# Patient Record
Sex: MALE | Race: WHITE | NOT HISPANIC OR LATINO | URBAN - METROPOLITAN AREA
[De-identification: names, ages, dates, MRNs, and addresses within clinical notes are randomized per-mention and may not be internally consistent; named-entity substitution may affect disease eponyms.]

---

## 2017-08-14 ENCOUNTER — OUTPATIENT (OUTPATIENT)
Dept: OUTPATIENT SERVICES | Facility: HOSPITAL | Age: 47
LOS: 1 days | Discharge: HOME | End: 2017-08-14

## 2017-08-14 DIAGNOSIS — E78.00 PURE HYPERCHOLESTEROLEMIA, UNSPECIFIED: ICD-10-CM

## 2017-08-14 DIAGNOSIS — E53.8 DEFICIENCY OF OTHER SPECIFIED B GROUP VITAMINS: ICD-10-CM

## 2017-08-14 DIAGNOSIS — N39.0 URINARY TRACT INFECTION, SITE NOT SPECIFIED: ICD-10-CM

## 2017-08-14 DIAGNOSIS — D50.9 IRON DEFICIENCY ANEMIA, UNSPECIFIED: ICD-10-CM

## 2017-08-14 DIAGNOSIS — E05.90 THYROTOXICOSIS, UNSPECIFIED WITHOUT THYROTOXIC CRISIS OR STORM: ICD-10-CM

## 2017-08-14 DIAGNOSIS — D64.9 ANEMIA, UNSPECIFIED: ICD-10-CM

## 2017-08-14 DIAGNOSIS — E11.9 TYPE 2 DIABETES MELLITUS WITHOUT COMPLICATIONS: ICD-10-CM

## 2017-08-14 DIAGNOSIS — R97.20 ELEVATED PROSTATE SPECIFIC ANTIGEN [PSA]: ICD-10-CM

## 2018-02-19 ENCOUNTER — OUTPATIENT (OUTPATIENT)
Dept: OUTPATIENT SERVICES | Facility: HOSPITAL | Age: 48
LOS: 1 days | Discharge: HOME | End: 2018-02-19

## 2018-02-19 DIAGNOSIS — D50.9 IRON DEFICIENCY ANEMIA, UNSPECIFIED: ICD-10-CM

## 2018-02-19 DIAGNOSIS — E05.90 THYROTOXICOSIS, UNSPECIFIED WITHOUT THYROTOXIC CRISIS OR STORM: ICD-10-CM

## 2018-02-19 DIAGNOSIS — D64.9 ANEMIA, UNSPECIFIED: ICD-10-CM

## 2018-02-19 DIAGNOSIS — E78.5 HYPERLIPIDEMIA, UNSPECIFIED: ICD-10-CM

## 2018-02-19 DIAGNOSIS — E11.9 TYPE 2 DIABETES MELLITUS WITHOUT COMPLICATIONS: ICD-10-CM

## 2018-08-20 ENCOUNTER — OUTPATIENT (OUTPATIENT)
Dept: OUTPATIENT SERVICES | Facility: HOSPITAL | Age: 48
LOS: 1 days | Discharge: HOME | End: 2018-08-20

## 2018-08-20 DIAGNOSIS — E05.90 THYROTOXICOSIS, UNSPECIFIED WITHOUT THYROTOXIC CRISIS OR STORM: ICD-10-CM

## 2018-08-20 DIAGNOSIS — N39.0 URINARY TRACT INFECTION, SITE NOT SPECIFIED: ICD-10-CM

## 2018-08-20 DIAGNOSIS — E11.9 TYPE 2 DIABETES MELLITUS WITHOUT COMPLICATIONS: ICD-10-CM

## 2018-08-20 DIAGNOSIS — D50.9 IRON DEFICIENCY ANEMIA, UNSPECIFIED: ICD-10-CM

## 2018-08-20 DIAGNOSIS — D64.9 ANEMIA, UNSPECIFIED: ICD-10-CM

## 2018-08-20 DIAGNOSIS — E55.9 VITAMIN D DEFICIENCY, UNSPECIFIED: ICD-10-CM

## 2018-08-20 DIAGNOSIS — E53.8 DEFICIENCY OF OTHER SPECIFIED B GROUP VITAMINS: ICD-10-CM

## 2018-08-20 DIAGNOSIS — E78.5 HYPERLIPIDEMIA, UNSPECIFIED: ICD-10-CM

## 2018-08-20 DIAGNOSIS — N40.0 BENIGN PROSTATIC HYPERPLASIA WITHOUT LOWER URINARY TRACT SYMPTOMS: ICD-10-CM

## 2019-02-25 ENCOUNTER — OUTPATIENT (OUTPATIENT)
Dept: OUTPATIENT SERVICES | Facility: HOSPITAL | Age: 49
LOS: 1 days | Discharge: HOME | End: 2019-02-25

## 2019-02-25 DIAGNOSIS — N40.0 BENIGN PROSTATIC HYPERPLASIA WITHOUT LOWER URINARY TRACT SYMPTOMS: ICD-10-CM

## 2019-02-25 DIAGNOSIS — E78.5 HYPERLIPIDEMIA, UNSPECIFIED: ICD-10-CM

## 2019-02-25 DIAGNOSIS — D64.9 ANEMIA, UNSPECIFIED: ICD-10-CM

## 2019-02-25 DIAGNOSIS — N39.0 URINARY TRACT INFECTION, SITE NOT SPECIFIED: ICD-10-CM

## 2019-02-25 DIAGNOSIS — D50.9 IRON DEFICIENCY ANEMIA, UNSPECIFIED: ICD-10-CM

## 2019-02-25 DIAGNOSIS — E55.9 VITAMIN D DEFICIENCY, UNSPECIFIED: ICD-10-CM

## 2019-02-25 DIAGNOSIS — E11.9 TYPE 2 DIABETES MELLITUS WITHOUT COMPLICATIONS: ICD-10-CM

## 2019-03-07 PROBLEM — Z00.00 ENCOUNTER FOR PREVENTIVE HEALTH EXAMINATION: Status: ACTIVE | Noted: 2019-03-07

## 2019-04-11 ENCOUNTER — APPOINTMENT (OUTPATIENT)
Dept: CARDIOLOGY | Facility: CLINIC | Age: 49
End: 2019-04-11
Payer: COMMERCIAL

## 2019-04-11 DIAGNOSIS — R94.31 ABNORMAL ELECTROCARDIOGRAM [ECG] [EKG]: ICD-10-CM

## 2019-04-11 PROCEDURE — 93325 DOPPLER ECHO COLOR FLOW MAPG: CPT

## 2019-04-11 PROCEDURE — 93320 DOPPLER ECHO COMPLETE: CPT

## 2019-04-11 PROCEDURE — 93351 STRESS TTE COMPLETE: CPT

## 2019-04-12 PROBLEM — R94.31 ABNORMAL EKG: Status: ACTIVE | Noted: 2019-04-12

## 2019-07-11 ENCOUNTER — APPOINTMENT (OUTPATIENT)
Dept: CARDIOLOGY | Facility: CLINIC | Age: 49
End: 2019-07-11

## 2019-09-04 ENCOUNTER — OUTPATIENT (OUTPATIENT)
Dept: OUTPATIENT SERVICES | Facility: HOSPITAL | Age: 49
LOS: 1 days | Discharge: HOME | End: 2019-09-04

## 2019-09-04 DIAGNOSIS — N39.0 URINARY TRACT INFECTION, SITE NOT SPECIFIED: ICD-10-CM

## 2019-09-04 DIAGNOSIS — E78.5 HYPERLIPIDEMIA, UNSPECIFIED: ICD-10-CM

## 2019-09-04 DIAGNOSIS — E05.90 THYROTOXICOSIS, UNSPECIFIED WITHOUT THYROTOXIC CRISIS OR STORM: ICD-10-CM

## 2019-09-04 DIAGNOSIS — N40.0 BENIGN PROSTATIC HYPERPLASIA WITHOUT LOWER URINARY TRACT SYMPTOMS: ICD-10-CM

## 2019-09-04 DIAGNOSIS — E55.9 VITAMIN D DEFICIENCY, UNSPECIFIED: ICD-10-CM

## 2019-09-04 DIAGNOSIS — D50.9 IRON DEFICIENCY ANEMIA, UNSPECIFIED: ICD-10-CM

## 2019-09-04 DIAGNOSIS — D64.9 ANEMIA, UNSPECIFIED: ICD-10-CM

## 2019-09-04 DIAGNOSIS — E03.8 OTHER SPECIFIED HYPOTHYROIDISM: ICD-10-CM

## 2019-09-04 DIAGNOSIS — E11.9 TYPE 2 DIABETES MELLITUS WITHOUT COMPLICATIONS: ICD-10-CM

## 2020-01-03 ENCOUNTER — OUTPATIENT (OUTPATIENT)
Dept: OUTPATIENT SERVICES | Facility: HOSPITAL | Age: 50
LOS: 1 days | Discharge: HOME | End: 2020-01-03
Payer: COMMERCIAL

## 2020-01-03 DIAGNOSIS — M54.5 LOW BACK PAIN: ICD-10-CM

## 2020-01-03 PROCEDURE — 72148 MRI LUMBAR SPINE W/O DYE: CPT | Mod: 26

## 2020-01-17 ENCOUNTER — OUTPATIENT (OUTPATIENT)
Dept: OUTPATIENT SERVICES | Facility: HOSPITAL | Age: 50
LOS: 1 days | Discharge: HOME | End: 2020-01-17
Payer: COMMERCIAL

## 2020-01-17 DIAGNOSIS — M25.561 PAIN IN RIGHT KNEE: ICD-10-CM

## 2020-01-17 PROCEDURE — 73721 MRI JNT OF LWR EXTRE W/O DYE: CPT | Mod: 26,RT

## 2020-04-25 ENCOUNTER — MESSAGE (OUTPATIENT)
Age: 50
End: 2020-04-25

## 2020-05-15 ENCOUNTER — APPOINTMENT (OUTPATIENT)
Dept: DISASTER EMERGENCY | Facility: HOSPITAL | Age: 50
End: 2020-05-15

## 2020-05-15 LAB
SARS-COV-2 IGG SERPL IA-ACNC: <0.1 INDEX
SARS-COV-2 IGG SERPL QL IA: NEGATIVE

## 2023-01-03 ENCOUNTER — EMERGENCY (EMERGENCY)
Facility: HOSPITAL | Age: 53
LOS: 0 days | Discharge: HOME | End: 2023-01-04
Attending: EMERGENCY MEDICINE | Admitting: EMERGENCY MEDICINE
Payer: COMMERCIAL

## 2023-01-03 VITALS
WEIGHT: 188.05 LBS | SYSTOLIC BLOOD PRESSURE: 188 MMHG | OXYGEN SATURATION: 99 % | TEMPERATURE: 96 F | HEART RATE: 85 BPM | RESPIRATION RATE: 16 BRPM | DIASTOLIC BLOOD PRESSURE: 108 MMHG

## 2023-01-03 DIAGNOSIS — R07.9 CHEST PAIN, UNSPECIFIED: ICD-10-CM

## 2023-01-03 DIAGNOSIS — M79.602 PAIN IN LEFT ARM: ICD-10-CM

## 2023-01-03 DIAGNOSIS — E78.5 HYPERLIPIDEMIA, UNSPECIFIED: ICD-10-CM

## 2023-01-03 DIAGNOSIS — Z20.822 CONTACT WITH AND (SUSPECTED) EXPOSURE TO COVID-19: ICD-10-CM

## 2023-01-03 DIAGNOSIS — R06.02 SHORTNESS OF BREATH: ICD-10-CM

## 2023-01-03 DIAGNOSIS — Z88.0 ALLERGY STATUS TO PENICILLIN: ICD-10-CM

## 2023-01-03 PROCEDURE — 99285 EMERGENCY DEPT VISIT HI MDM: CPT

## 2023-01-03 PROCEDURE — 93010 ELECTROCARDIOGRAM REPORT: CPT

## 2023-01-04 VITALS
OXYGEN SATURATION: 98 % | SYSTOLIC BLOOD PRESSURE: 142 MMHG | RESPIRATION RATE: 17 BRPM | DIASTOLIC BLOOD PRESSURE: 82 MMHG | HEART RATE: 62 BPM

## 2023-01-04 LAB
ALBUMIN SERPL ELPH-MCNC: 4.6 G/DL — SIGNIFICANT CHANGE UP (ref 3.5–5.2)
ALP SERPL-CCNC: 48 U/L — SIGNIFICANT CHANGE UP (ref 30–115)
ALT FLD-CCNC: 54 U/L — HIGH (ref 0–41)
ANION GAP SERPL CALC-SCNC: 11 MMOL/L — SIGNIFICANT CHANGE UP (ref 7–14)
AST SERPL-CCNC: 26 U/L — SIGNIFICANT CHANGE UP (ref 0–41)
BASOPHILS # BLD AUTO: 0.04 K/UL — SIGNIFICANT CHANGE UP (ref 0–0.2)
BASOPHILS NFR BLD AUTO: 0.6 % — SIGNIFICANT CHANGE UP (ref 0–1)
BILIRUB SERPL-MCNC: 0.4 MG/DL — SIGNIFICANT CHANGE UP (ref 0.2–1.2)
BUN SERPL-MCNC: 14 MG/DL — SIGNIFICANT CHANGE UP (ref 10–20)
CALCIUM SERPL-MCNC: 9.5 MG/DL — SIGNIFICANT CHANGE UP (ref 8.4–10.5)
CHLORIDE SERPL-SCNC: 100 MMOL/L — SIGNIFICANT CHANGE UP (ref 98–110)
CO2 SERPL-SCNC: 27 MMOL/L — SIGNIFICANT CHANGE UP (ref 17–32)
CREAT SERPL-MCNC: 1 MG/DL — SIGNIFICANT CHANGE UP (ref 0.7–1.5)
EGFR: 91 ML/MIN/1.73M2 — SIGNIFICANT CHANGE UP
EOSINOPHIL # BLD AUTO: 0.28 K/UL — SIGNIFICANT CHANGE UP (ref 0–0.7)
EOSINOPHIL NFR BLD AUTO: 3.9 % — SIGNIFICANT CHANGE UP (ref 0–8)
GLUCOSE SERPL-MCNC: 103 MG/DL — HIGH (ref 70–99)
HCT VFR BLD CALC: 46 % — SIGNIFICANT CHANGE UP (ref 42–52)
HGB BLD-MCNC: 15.5 G/DL — SIGNIFICANT CHANGE UP (ref 14–18)
IMM GRANULOCYTES NFR BLD AUTO: 0.3 % — SIGNIFICANT CHANGE UP (ref 0.1–0.3)
LIDOCAIN IGE QN: 26 U/L — SIGNIFICANT CHANGE UP (ref 7–60)
LYMPHOCYTES # BLD AUTO: 2.35 K/UL — SIGNIFICANT CHANGE UP (ref 1.2–3.4)
LYMPHOCYTES # BLD AUTO: 32.9 % — SIGNIFICANT CHANGE UP (ref 20.5–51.1)
MAGNESIUM SERPL-MCNC: 2.1 MG/DL — SIGNIFICANT CHANGE UP (ref 1.8–2.4)
MCHC RBC-ENTMCNC: 28.8 PG — SIGNIFICANT CHANGE UP (ref 27–31)
MCHC RBC-ENTMCNC: 33.7 G/DL — SIGNIFICANT CHANGE UP (ref 32–37)
MCV RBC AUTO: 85.5 FL — SIGNIFICANT CHANGE UP (ref 80–94)
MONOCYTES # BLD AUTO: 0.64 K/UL — HIGH (ref 0.1–0.6)
MONOCYTES NFR BLD AUTO: 9 % — SIGNIFICANT CHANGE UP (ref 1.7–9.3)
NEUTROPHILS # BLD AUTO: 3.82 K/UL — SIGNIFICANT CHANGE UP (ref 1.4–6.5)
NEUTROPHILS NFR BLD AUTO: 53.3 % — SIGNIFICANT CHANGE UP (ref 42.2–75.2)
NRBC # BLD: 0 /100 WBCS — SIGNIFICANT CHANGE UP (ref 0–0)
NT-PROBNP SERPL-SCNC: 8 PG/ML — SIGNIFICANT CHANGE UP (ref 0–300)
PLATELET # BLD AUTO: 160 K/UL — SIGNIFICANT CHANGE UP (ref 130–400)
POTASSIUM SERPL-MCNC: 3.8 MMOL/L — SIGNIFICANT CHANGE UP (ref 3.5–5)
POTASSIUM SERPL-SCNC: 3.8 MMOL/L — SIGNIFICANT CHANGE UP (ref 3.5–5)
PROT SERPL-MCNC: 6.7 G/DL — SIGNIFICANT CHANGE UP (ref 6–8)
RBC # BLD: 5.38 M/UL — SIGNIFICANT CHANGE UP (ref 4.7–6.1)
RBC # FLD: 13.3 % — SIGNIFICANT CHANGE UP (ref 11.5–14.5)
SARS-COV-2 RNA SPEC QL NAA+PROBE: SIGNIFICANT CHANGE UP
SODIUM SERPL-SCNC: 138 MMOL/L — SIGNIFICANT CHANGE UP (ref 135–146)
TROPONIN T SERPL-MCNC: <0.01 NG/ML — SIGNIFICANT CHANGE UP
TROPONIN T SERPL-MCNC: <0.01 NG/ML — SIGNIFICANT CHANGE UP
WBC # BLD: 7.15 K/UL — SIGNIFICANT CHANGE UP (ref 4.8–10.8)
WBC # FLD AUTO: 7.15 K/UL — SIGNIFICANT CHANGE UP (ref 4.8–10.8)

## 2023-01-04 PROCEDURE — 99223 1ST HOSP IP/OBS HIGH 75: CPT

## 2023-01-04 PROCEDURE — 93010 ELECTROCARDIOGRAM REPORT: CPT

## 2023-01-04 PROCEDURE — 75574 CT ANGIO HRT W/3D IMAGE: CPT | Mod: 26,MF

## 2023-01-04 PROCEDURE — 71045 X-RAY EXAM CHEST 1 VIEW: CPT | Mod: 26

## 2023-01-04 PROCEDURE — G1004: CPT

## 2023-01-04 RX ORDER — ASPIRIN/CALCIUM CARB/MAGNESIUM 324 MG
1 TABLET ORAL
Qty: 30 | Refills: 0
Start: 2023-01-04 | End: 2023-02-02

## 2023-01-04 RX ORDER — METOPROLOL TARTRATE 50 MG
100 TABLET ORAL ONCE
Refills: 0 | Status: DISCONTINUED | OUTPATIENT
Start: 2023-01-04 | End: 2023-01-04

## 2023-01-04 RX ORDER — ASPIRIN/CALCIUM CARB/MAGNESIUM 324 MG
1 TABLET ORAL
Qty: 14 | Refills: 0
Start: 2023-01-04 | End: 2023-01-17

## 2023-01-04 RX ORDER — METOPROLOL TARTRATE 50 MG
50 TABLET ORAL ONCE
Refills: 0 | Status: COMPLETED | OUTPATIENT
Start: 2023-01-04 | End: 2023-01-04

## 2023-01-04 RX ADMIN — Medication 50 MILLIGRAM(S): at 06:33

## 2023-01-04 NOTE — ED CDU PROVIDER INITIAL DAY NOTE - ATTENDING APP SHARED VISIT CONTRIBUTION OF CARE
52-year-old male past medical history of hyperlipidemia presents with chest pain.  Patient states that while driving started to feel left chest and left arm pain and pressure.  No shortness of breath.  Lasted for few hours then resolved.  No nausea vomiting.  No abdominal pain.  No numbness tingling or weakness.  No similar episodes in the past.  Patient follows with Dr. valencia but states the last stress test was over 3 years ago.  No leg swelling or pain.  Former smoker.    CONSTITUTIONAL: Well-developed; well-nourished; in no acute distress.   SKIN: warm, dry  HEAD: Normocephalic; atraumatic.  EYES: PERRL, EOMI, no conjunctival erythema  ENT: No nasal discharge; airway clear.  NECK: Supple; non tender.  CARD: S1, S2 normal;  Regular rate and rhythm.   RESP: No wheezes, rales or rhonchi.  ABD: soft non tender, non distended, no rebound or guarding  EXT: Normal ROM.  5/5 strength in all 4 extremities   LYMPH: No acute cervical adenopathy.  NEURO: Alert, oriented, grossly unremarkable. neurovascularly intact  PSYCH: Cooperative, appropriate.

## 2023-01-04 NOTE — ED CDU PROVIDER INITIAL DAY NOTE - OBJECTIVE STATEMENT
51 yo M with PMHx of HLD presents to the ED for evaluation of moderate L sided chest pain that started earlier tonight and has been persisting. Pain is pressure like, constant radiates to left arm and is associated with mild SOB. Pt has had intermittent pain over the last few weeks but never this bad. He admits to family hx of CAD, is non-smoker and last had CCTA > 5 years ago. He denies other complaints.

## 2023-01-04 NOTE — ED PROVIDER NOTE - NS ED ROS FT
Review of Systems  Constitutional:  No fever, chills.  Eyes:  No visual changes, eye pain, or discharge.  ENMT:  No hearing changes, pain, or discharge. No nasal congestion, discharge, or bleeding. No throat pain, swelling, or difficulty swallowing.  Cardiac:  No palpitations, syncope, or edema. (+) chest pain  Respiratory:  No cough. No hemoptysis. (+) SOB  GI:  No nausea, vomiting, diarrhea, or abdominal pain.   :  No dysuria, hematuria, frequency, or burning.   MS:  No back pain.  Skin:  No skin rash, pruritis, jaundice, or lesions.  Neuro:  No headache, dizziness, loss of sensation, or focal weakness.  No change in mental status.   Endocrine: No history of thyroid disease or diabetes.

## 2023-01-04 NOTE — ED CDU PROVIDER DISPOSITION NOTE - PATIENT PORTAL LINK FT
You can access the FollowMyHealth Patient Portal offered by [facility] by registering at the following website: [email or web address]. By joining [employer] [facility] [employer] [facility]’s FollowMyHealth portal, you will also be able to view your health information using other applications (apps) compatible with our system.

## 2023-01-04 NOTE — ED CDU PROVIDER INITIAL DAY NOTE - CLINICAL SUMMARY MEDICAL DECISION MAKING FREE TEXT BOX
Patient presents with chest pain.  Labs EKG chest x-ray done.  No acute findings.  Patient placed in ops for further cardiac evaluation.

## 2023-01-04 NOTE — ED PROVIDER NOTE - OBJECTIVE STATEMENT
53 yo M with PMHx of HLD presents to the ED for evaluation of moderate L sided chest pain that started earlier tonight and has been persisting. Pain is pressure like, constant radiates to left arm and is associated with mild SOB. Pt has had intermittent pain over the last few weeks but never this bad. He admits to family hx of CAD, is non-smoker and last had CCTA > 5 years ago. He denies other complaints.

## 2023-01-04 NOTE — ED PROVIDER NOTE - NSICDXFAMILYHX_GEN_ALL_CORE_FT
FAMILY HISTORY:  Father  Still living? Unknown  Family history of early CAD, Age at diagnosis: Age Unknown    Sibling  Still living? Unknown  Family history of early CAD, Age at diagnosis: Age Unknown

## 2023-01-04 NOTE — ED PROVIDER NOTE - ATTENDING APP SHARED VISIT CONTRIBUTION OF CARE
52M non-smoker pmh hl p/w CP x 1d. Started at rest, pressure-like, radiating down LUE, accomp by mild sob. Has been occuring intermittently x sev wks. No dizziness, diaphoresis, nv, abd pain, leg edema. +FHx CAD. CCTA >5y ago.     PE:  nad  skin warm, dry  ncat  neck supple  rrr nl s1s2 no mrg  ctab no wrr  abd soft ntnd no palpable masses no rgr  back non-tender no cvat  ext no cce dpi  neuro aaox3 grossly nf exam

## 2023-01-04 NOTE — ED CDU PROVIDER DISPOSITION NOTE - CLINICAL COURSE
Patient presents with chest pain.  Labs EKG chest x-ray done.  No acute findings.  Patient placed in ops for further cardiac evaluation. CCTA done which shows a score of 1. Results discussed with patient. Patient is already on statin, will start him on daily asprin. Discharged with cardiology follow up and return precautions.

## 2023-01-04 NOTE — ED CDU PROVIDER DISPOSITION NOTE - NSFOLLOWUPINSTRUCTIONS_ED_ALL_ED_FT
Please follow up with a cardiologist as soon as possible.     Chest Pain    Chest pain can be caused by many different conditions which may or may not be dangerous. Causes include heartburn, lung infections, heart attack, blood clot in lungs, skin infections, strain or damage to muscle, cartilage, or bones, etc. In addition to a history and physical examination, an electrocardiogram (ECG) or other lab tests may have been performed to determine the cause of your chest pain. Follow up with your primary care provider or with a cardiologist as instructed.     SEEK IMMEDIATE MEDICAL CARE IF YOU HAVE ANY OF THE FOLLOWING SYMPTOMS: worsening chest pain, coughing up blood, unexplained back/neck/jaw pain, severe abdominal pain, dizziness or lightheadedness, fainting, shortness of breath, sweaty or clammy skin, vomiting, or racing heart beat. These symptoms may represent a serious problem that is an emergency. Do not wait to see if the symptoms will go away. Get medical help right away. Call 911 and do not drive yourself to the hospital.

## 2023-01-04 NOTE — ED PROVIDER NOTE - PHYSICAL EXAMINATION
VITAL SIGNS: I have reviewed nursing notes and confirm.  CONSTITUTIONAL: 53 yo M laying on stretcher; in no acute distress.  SKIN: Skin exam is warm and dry, no acute rash.  HEAD: Normocephalic; atraumatic.  EYES: Conjunctiva and sclera clear.  ENT: No nasal discharge; airway clear.   NECK: Supple; non tender.  CARD: S1, S2 normal; no murmurs, gallops, or rubs. Regular rate and rhythm.  RESP: No wheezes, rales or rhonchi. Speaking in full sentences.   ABD: Normal bowel sounds; soft; non-distended; non-tender; No rebound or guarding. No CVA tenderness.  EXT: Normal ROM. No clubbing, cyanosis or edema. No calf TTP or swelling. Radial and DP 2+ B/L and equal.   NEURO: Alert, oriented. Grossly unremarkable. No focal deficits.

## 2023-01-04 NOTE — ED PROVIDER NOTE - NS ED ATTENDING STATEMENT MOD
This was a shared visit with the TERESSA. I reviewed and verified the documentation and independently performed the documented:

## 2023-01-04 NOTE — ED CDU PROVIDER DISPOSITION NOTE - NS ED ATTENDING STATEMENT MOD
This was a shared visit with the TERESSA. I reviewed and verified the documentation and independently performed the documented: Attending with

## 2023-01-04 NOTE — ED CDU PROVIDER DISPOSITION NOTE - CARE PROVIDER_API CALL
Maxim Styles (MD)  Cardiovascular Disease; Interventional Cardiology  [address]  [place] [address]  Phone: [phone]  Fax: [phone]  Follow Up Time:

## 2023-08-02 ENCOUNTER — OUTPATIENT (OUTPATIENT)
Dept: OUTPATIENT SERVICES | Facility: HOSPITAL | Age: 53
LOS: 1 days | End: 2023-08-02
Payer: COMMERCIAL

## 2023-08-02 DIAGNOSIS — M54.9 DORSALGIA, UNSPECIFIED: ICD-10-CM

## 2023-08-02 DIAGNOSIS — Z00.8 ENCOUNTER FOR OTHER GENERAL EXAMINATION: ICD-10-CM

## 2023-08-02 PROCEDURE — 72148 MRI LUMBAR SPINE W/O DYE: CPT

## 2023-08-02 PROCEDURE — 72148 MRI LUMBAR SPINE W/O DYE: CPT | Mod: 26

## 2023-08-03 DIAGNOSIS — M54.9 DORSALGIA, UNSPECIFIED: ICD-10-CM

## 2023-08-30 ENCOUNTER — APPOINTMENT (OUTPATIENT)
Dept: NEUROSURGERY | Facility: CLINIC | Age: 53
End: 2023-08-30
Payer: COMMERCIAL

## 2023-08-30 VITALS — HEIGHT: 69 IN | BODY MASS INDEX: 32.58 KG/M2 | WEIGHT: 220 LBS

## 2023-08-30 DIAGNOSIS — F17.200 NICOTINE DEPENDENCE, UNSPECIFIED, UNCOMPLICATED: ICD-10-CM

## 2023-08-30 DIAGNOSIS — Z80.9 FAMILY HISTORY OF MALIGNANT NEOPLASM, UNSPECIFIED: ICD-10-CM

## 2023-08-30 DIAGNOSIS — Z86.79 PERSONAL HISTORY OF OTHER DISEASES OF THE CIRCULATORY SYSTEM: ICD-10-CM

## 2023-08-30 DIAGNOSIS — Z83.3 FAMILY HISTORY OF DIABETES MELLITUS: ICD-10-CM

## 2023-08-30 PROCEDURE — 99204 OFFICE O/P NEW MOD 45 MIN: CPT

## 2023-08-30 RX ORDER — ROSUVASTATIN CALCIUM 5 MG/1
TABLET, FILM COATED ORAL
Refills: 0 | Status: ACTIVE | COMMUNITY

## 2023-08-30 RX ORDER — EZETIMIBE 10 MG/1
TABLET ORAL
Refills: 0 | Status: ACTIVE | COMMUNITY

## 2023-08-30 RX ORDER — VALSARTAN 40 MG/1
TABLET, COATED ORAL
Refills: 0 | Status: ACTIVE | COMMUNITY

## 2023-08-30 RX ORDER — AMLODIPINE BESYLATE 5 MG/1
5 TABLET ORAL
Refills: 0 | Status: ACTIVE | COMMUNITY

## 2023-08-30 NOTE — ASSESSMENT
[FreeTextEntry1] : This is a 54 yo M who presents for neurosurgical consultation with regards to lumbar radiculopathy in the setting of spondylolisthesis at L5 along with neuroforaminal narrowing most notable at right L4-5.  X-ray lumbar flex/ext warranted for my review in order to assess stability at the L5 segment  Patient has been referred for physical therapy. Sessions advised 2-3 times weekly in conjunction with home PT program  Pain Management consultation to consider LSNRI  Patient to return to the office in 3 months for continued care/treatment efforts. We have briefly discussed the possibly indication for L4-5 decompressive laminectomy with Coflex. Further discussion will take place at his next encounter pending his above mentioned treatment response.  ZAINAB Espinoza MD

## 2023-08-30 NOTE — HISTORY OF PRESENT ILLNESS
[de-identified] : CHIEF COMPLAINT: Mr. Valencia is a 52 yo M who presents for neurosurgical consultation with regards to low back pain with associated radicular features into the bilateral lower extremities. Symptoms have intensified over the previous six months but he notes a chronic element of low back pain spanning 20+ years. Subjective weakness noted into the lower extremities with constant numbness/ tingling/ burning emanating from the low back into the entirety of the bilateral lower extremities.  CONSERVATIVE MANAGEMENT TRIALED: No recent physical therapy or Pain Management treatments have been completed  DIAGNOSTIC TESTING: MR COLON Spine- [facility]- 8/2/2023- grade 1 spondylolisthesis at L5. Degenerative disc changes most notable at L4-S1 with central and biforaminal narrowing, worse onto the right at L4-5.   PHYSICAL EXAM:  Neurologic:  CN II-XII grossly intact Palpation: (+) lumbar paravertebral tenderness to palpation Strength: (+) bilateral hip flexor 4+/5, otherwise 5/5 throughout all remaining muscle groups Sensation: Full sensation to light touch in all extremities Reflexes:   2+ patellar  2+ ankle jerk  ROM limited  Signs: SLR negative  Gait: steady, walking w/o assistance.

## 2023-08-31 ENCOUNTER — APPOINTMENT (OUTPATIENT)
Dept: PAIN MANAGEMENT | Facility: CLINIC | Age: 53
End: 2023-08-31
Payer: COMMERCIAL

## 2023-08-31 PROCEDURE — 99204 OFFICE O/P NEW MOD 45 MIN: CPT

## 2023-08-31 RX ORDER — ROSUVASTATIN CALCIUM 10 MG/1
10 TABLET, FILM COATED ORAL
Refills: 0 | Status: ACTIVE | COMMUNITY

## 2023-09-01 ENCOUNTER — OUTPATIENT (OUTPATIENT)
Dept: OUTPATIENT SERVICES | Facility: HOSPITAL | Age: 53
LOS: 1 days | End: 2023-09-01
Payer: COMMERCIAL

## 2023-09-01 DIAGNOSIS — M43.10 SPONDYLOLISTHESIS, SITE UNSPECIFIED: ICD-10-CM

## 2023-09-01 PROCEDURE — 72110 X-RAY EXAM L-2 SPINE 4/>VWS: CPT

## 2023-09-01 PROCEDURE — 72110 X-RAY EXAM L-2 SPINE 4/>VWS: CPT | Mod: 26

## 2023-09-02 DIAGNOSIS — M43.10 SPONDYLOLISTHESIS, SITE UNSPECIFIED: ICD-10-CM

## 2023-09-04 NOTE — HISTORY OF PRESENT ILLNESS
[FreeTextEntry1] : HPI: Mr. Valencia is a 53 year old male with a chief complaint of low back and leg pain. This patient had a disc injury few years ago and has worsened over the last 6 months from ware and tare. Today we reviewed his MRI of the lumbar spine in detail with the patient. The patients concerned today is the weakness and numbness in legs when standing prolong, right is worse than left lower leg. The pain comes on upon standing across the low back. He reports of intermittent bilateral radiating pain down thighs and lateral leg to the knee that comes on when prolong sitting. The pain is moderate to severe that is nearly constant of the time with symptoms worsening in no typical pattern. The pain is sharp, pressure-like, cramping, dull/achy, and throbbing. The pain is increased with standing, sitting, walking, and exercise. The pain is decreased with lying down and relaxation. The patient pain is 6/10 on the pain scale today.  ACTIVITIES: Patient can sit 1 hour before pain starts.  Patient can stand 10 minutes before pain starts.  The patient never lies down because of pain.  Patient uses no assisted walking device at this time.  Patient has difficulty performing household chores, going to work, doing yardwork or shopping, socializing with friends, participating in recreational activities and exercise at this time.

## 2023-09-04 NOTE — DISCUSSION/SUMMARY
[de-identified] : A discussion regarding available pain management treatment options occurred with the patient.  These included interventional, rehabilitative, pharmacological, and alternative modalities. We will proceed with the following:  Interventional treatment options: Will proceed with Right sided L4-L5, L5-S1 TFESI w/ MAC Treatment options were discussed. The patient has been having persistent, severe low back pain and lumbar radicular pain that has minimally improved with conservative therapy thus far (including physical therapy, home stretching and anti-inflammatory medications. The patient was given the option of proceeding with a lumbar epidural steroid injection to try and get the patient some pain relief. The risks and benefits were discussed, which included the associated problems with steroids, bleeding, nerve injury, lack of improvement with pain, and 0.5% chance of a post dural puncture headache.  Plan to perform left sided L4-L5, L5-S1 TFESI w/ MAC 2-4 weeks after the left  Rehabilitative options: - participation in active HEP was discussed  Complementary treatment options: - lifestyle modifications discussed  This patient is going for his flexion and extension X ray of the lumbar spine   I Farnaz Escoto attest that this documentation has been prepared under the direction and in the presence of provider Dr. Maurilio Ortiz.  The documentation recorded by the scribe in my presence, accurately reflects the service I personally performed, and the decisions made by me with my edits as appropriate.   Maurilio Ortiz, DO

## 2023-09-04 NOTE — DATA REVIEWED
[FreeTextEntry1] : MRI of the lumbar spine 8/2/23: Multilevel degenerative changes and disc bulging contributing to spinal canal as well as neuroforaminal narrowing most significant at the L4-L5 and L5-S1 levels Grade 1 retrolisthesis of L4 and L5 with uncovering of the disc space which is slightly worsened since the prior MRI contributing to severe spinal stenosis as well as BL neuroforaminal narrowing.  Marrow edema noted of the BL L5 laminae which may reflect advanced degenerative changes, stress reaction or traumatic injury. Mild BL hydroureteronephrosis, new

## 2023-09-04 NOTE — PHYSICAL EXAM
[de-identified] : Lumbar Spine Exam: Inspection: erythema (-) ecchymosis (-) rashes (-) alignment: no scoliosis  Palpation: Midline lumbar tenderness:             (-) paraspinal tenderness:                  L (-) ; R (-) sciatic nerve tenderness :             L (-) ; R (+) SI joint tenderness:                        L (-) ; R (-) GTB tenderness:                            L (-);  R (-)  Limited ROM 2/2 to pain w/ lumbar flexion  Strength: 5/5 throughout all muscle groups of the lower extremity                                     Right       Left    Hip Flexion:                (5/5)       (5/5) Quadriceps:               (5/5)       (5/5) Hamstrings:                (5/5)       (5/5) Ankle Dorsiflexion:     (5/5)       (5/5) EHL:                           (5/5)       (5/5) Ankle Plantarflexion:  (5/5)       (5/5)  Special Tests: SLR:                           R (+) ; L (-) Facet loading:            R (+) ; L (-) SHARRON test:               R (-) ; L (-)  Neurologic: Light touch intact throughout LE  Reflexes normal and symmetric   Gait: non- antalgic gait ambulates w/o assistive device

## 2023-09-20 ENCOUNTER — APPOINTMENT (OUTPATIENT)
Dept: PAIN MANAGEMENT | Facility: CLINIC | Age: 53
End: 2023-09-20

## 2023-10-04 ENCOUNTER — APPOINTMENT (OUTPATIENT)
Dept: PAIN MANAGEMENT | Facility: CLINIC | Age: 53
End: 2023-10-04

## 2023-10-04 ENCOUNTER — APPOINTMENT (OUTPATIENT)
Dept: PAIN MANAGEMENT | Facility: CLINIC | Age: 53
End: 2023-10-04
Payer: COMMERCIAL

## 2023-10-04 ENCOUNTER — TRANSCRIPTION ENCOUNTER (OUTPATIENT)
Age: 53
End: 2023-10-04

## 2023-10-04 PROCEDURE — 64484 NJX AA&/STRD TFRM EPI L/S EA: CPT | Mod: RT

## 2023-10-04 PROCEDURE — 94761 N-INVAS EAR/PLS OXIMETRY MLT: CPT | Mod: 59

## 2023-10-04 PROCEDURE — 93040 RHYTHM ECG WITH REPORT: CPT | Mod: 59

## 2023-10-04 PROCEDURE — 93770 DETERMINATION VENOUS PRESS: CPT | Mod: 59

## 2023-10-04 PROCEDURE — 64483 NJX AA&/STRD TFRM EPI L/S 1: CPT | Mod: RT

## 2023-10-24 ENCOUNTER — APPOINTMENT (OUTPATIENT)
Dept: PAIN MANAGEMENT | Facility: CLINIC | Age: 53
End: 2023-10-24
Payer: COMMERCIAL

## 2023-10-24 VITALS — WEIGHT: 220 LBS | HEIGHT: 69 IN | BODY MASS INDEX: 32.58 KG/M2

## 2023-10-24 DIAGNOSIS — M54.16 RADICULOPATHY, LUMBAR REGION: ICD-10-CM

## 2023-10-24 PROCEDURE — 99214 OFFICE O/P EST MOD 30 MIN: CPT

## 2023-10-24 RX ORDER — MELOXICAM 15 MG/1
15 TABLET ORAL
Qty: 30 | Refills: 0 | Status: ACTIVE | COMMUNITY
Start: 2023-10-24 | End: 1900-01-01

## 2023-11-08 ENCOUNTER — APPOINTMENT (OUTPATIENT)
Dept: PAIN MANAGEMENT | Facility: CLINIC | Age: 53
End: 2023-11-08

## 2023-11-22 ENCOUNTER — APPOINTMENT (OUTPATIENT)
Dept: PAIN MANAGEMENT | Facility: CLINIC | Age: 53
End: 2023-11-22

## 2023-11-29 ENCOUNTER — APPOINTMENT (OUTPATIENT)
Dept: NEUROSURGERY | Facility: CLINIC | Age: 53
End: 2023-11-29
Payer: COMMERCIAL

## 2023-11-29 VITALS — WEIGHT: 220 LBS | HEIGHT: 69 IN | BODY MASS INDEX: 32.58 KG/M2

## 2023-11-29 DIAGNOSIS — M48.07 SPINAL STENOSIS, LUMBOSACRAL REGION: ICD-10-CM

## 2023-11-29 DIAGNOSIS — M43.10 SPONDYLOLISTHESIS, SITE UNSPECIFIED: ICD-10-CM

## 2023-11-29 PROCEDURE — 99213 OFFICE O/P EST LOW 20 MIN: CPT

## 2023-12-18 ENCOUNTER — APPOINTMENT (OUTPATIENT)
Dept: PAIN MANAGEMENT | Facility: CLINIC | Age: 53
End: 2023-12-18

## 2024-02-12 ENCOUNTER — APPOINTMENT (OUTPATIENT)
Dept: NEUROSURGERY | Facility: CLINIC | Age: 54
End: 2024-02-12

## 2024-07-08 ENCOUNTER — APPOINTMENT (OUTPATIENT)
Dept: PAIN MANAGEMENT | Facility: CLINIC | Age: 54
End: 2024-07-08

## 2024-09-05 ENCOUNTER — APPOINTMENT (OUTPATIENT)
Dept: PAIN MANAGEMENT | Facility: CLINIC | Age: 54
End: 2024-09-05
Payer: COMMERCIAL

## 2024-09-05 DIAGNOSIS — M43.10 SPONDYLOLISTHESIS, SITE UNSPECIFIED: ICD-10-CM

## 2024-09-05 DIAGNOSIS — M54.16 RADICULOPATHY, LUMBAR REGION: ICD-10-CM

## 2024-09-05 PROCEDURE — 99214 OFFICE O/P EST MOD 30 MIN: CPT

## 2024-09-05 NOTE — DATA REVIEWED
[FreeTextEntry1] : Lumbosacral spine flexion and extension done on 9/1/23: IMPRESSION: Grade 1 anterolisthesis of L4-5 and L5-S1, without evidence of motion.  MRI L Spine- 8/2/2023- grade 1 spondylolisthesis at L5. Degenerative disc changes most notable at L4-S1 with central and biforaminal narrowing, worse onto the right at L4-5.

## 2024-09-05 NOTE — DISCUSSION/SUMMARY
[de-identified] : A discussion regarding available pain management treatment options occurred with the patient.  These included interventional, rehabilitative, pharmacological, and alternative modalities. We will proceed with the followin. Will proceed with bilateral L5-S1 TFESI w/ MAC 2. participation in active HEP  3. Ordered to start Meloxicam 15mg qD PRN  Treatment options were discussed. The patient has been having persistent, severe low back pain and lumbar radicular pain that has minimally improved with conservative therapy thus far (including physical therapy, home stretching and anti-inflammatory medications. The patient was given the option of proceeding with a left sided lumbar epidural steroid injection to try and get the patient some pain relief. The risks and benefits were discussed, which included the associated problems with steroids, bleeding, nerve injury, lack of improvement with pain, and 0.5% chance of a post dural puncture headache.  Home exercise program: Patient given specific home exercises to do including but not limited to: pelvic tilt, knee to chest, and lower trunk rotation.  I advised Maurilio, that the NSAID should be taken with food.  In addition while taking the prescribed NSAID, no over the counter or other NSAIDs should be used, such as ibuprofen (Motrin or Advil) or naproxen (Aleve) as this can cause stomach upset or other side effects.  If needed for fever or breakthrough pain Tylenol can be used.  follow up two weeks after injection   I Farnaz Escoto attest that this documentation has been prepared under the direction and in the presence of provider Dr. Maurilio Ortiz.  The documentation recorded by the scribe in my presence, accurately reflects the service I personally performed, and the decisions made by me with my edits as appropriate.   Maurilio Ortiz, DO

## 2024-09-05 NOTE — PHYSICAL EXAM
[de-identified] : L spine   No rashes, erythema, edema noted TTP b/l lumbar paraspinals and sciatic notch Positive straight leg raise bilaterally LLE: 5/5 strength RLE: 5/5 strength Sensation intact b/l LE

## 2024-09-23 ENCOUNTER — APPOINTMENT (OUTPATIENT)
Facility: CLINIC | Age: 54
End: 2024-09-23

## 2024-09-23 ENCOUNTER — APPOINTMENT (OUTPATIENT)
Dept: PAIN MANAGEMENT | Facility: CLINIC | Age: 54
End: 2024-09-23
Payer: COMMERCIAL

## 2024-09-23 DIAGNOSIS — M54.16 RADICULOPATHY, LUMBAR REGION: ICD-10-CM

## 2024-09-23 PROCEDURE — 64483 NJX AA&/STRD TFRM EPI L/S 1: CPT | Mod: 50

## 2024-09-23 PROCEDURE — 00630 ANES PX LUMBAR REGION NOS: CPT | Mod: QZ,P2

## 2024-09-23 NOTE — PROCEDURE
[FreeTextEntry3] : Date of Service: 09/23/2024 Account: [id number] Patient: LISA VELAZQUEZ YOB: 1970 Age: 54 year Surgeon:   Lisa Ortiz DO Assistant:    None Pre-Operative Diagnosis:         Lumbosacral radiculopathy Post Operative Diagnosis:        Lumbosacral radiculopathy Procedure:             Bilateral L5-S1 transforaminal epidural steroid injection under fluoroscopic guidance. Anesthesia:           None   This procedure was carried out using fluoroscopic guidance.  The risks and benefits of the procedure were discussed extensively with the patient.  The consent of the patient was obtained and the following procedure was performed. The patient was placed in the prone position on the fluoroscopy table and the area was prepped and draped in a sterile fashion.  A timeout was performed with all essential staff present and the site and side were verified.   The right L5-S1 neural foramen was then identified on right oblique "lbua dog" anatomical view at the 6 o' clock position using fluoroscopic guidance, and the area was marked.  A 25 gauge 3.5 inch spinal needle was directed toward the inferior (6 o'clock) position of the pedicle, which formed the roof of the identified foramen.  Once in the epidural space, after negative aspiration for heme and CSF, 1cc of Omnipaque contrast was injected to confirm epidural location and assess filling defects and rule out intravascular needle placement.   Lumbar epidurogram showed no intravascular or intrathecal flow pattern.  No blood or CSF was aspirated.  Omnipaque spread medially in epidural space and outlined the exiting nerve root.   After this, 2.5 cc of a 5cc mixture of preservative free normal saline plus 10mg of Decadron was injected in the epidural space.   The same exact procedure was performed on the contralateral (left) side and the remainder 2.5cc of saline & decadron was injected in the epidural space after negative aspiration for csf or heme.   The needle was subsequently removed.  Vital signs remained normal.  Pulse oximeter was used throughout the procedure and the patient's pulse and oxygen saturation remained within normal limits.  The patient tolerated the procedure well.  There were no complications.  The patient was instructed to apply ice over the injection sites for twenty minutes every two hours for the next 24 to 48 hours.   Disposition:      1. The patient was advised to F/U in 1-2 weeks to assess the response to the injection.      2. The patient was also instructed to contact me immediately if there were any concerns related to the procedure performed.

## 2024-10-08 ENCOUNTER — APPOINTMENT (OUTPATIENT)
Dept: PAIN MANAGEMENT | Facility: CLINIC | Age: 54
End: 2024-10-08
Payer: COMMERCIAL

## 2024-10-08 VITALS — BODY MASS INDEX: 32.58 KG/M2 | HEIGHT: 69 IN | WEIGHT: 220 LBS

## 2024-10-08 DIAGNOSIS — M47.816 SPONDYLOSIS W/OUT MYELOPATHY OR RADICULOPATHY, LUMBAR REGION: ICD-10-CM

## 2024-10-08 PROCEDURE — 99214 OFFICE O/P EST MOD 30 MIN: CPT

## 2024-10-21 ENCOUNTER — APPOINTMENT (OUTPATIENT)
Dept: PAIN MANAGEMENT | Facility: CLINIC | Age: 54
End: 2024-10-21
Payer: COMMERCIAL

## 2024-10-21 ENCOUNTER — APPOINTMENT (OUTPATIENT)
Facility: CLINIC | Age: 54
End: 2024-10-21
Payer: COMMERCIAL

## 2024-10-21 DIAGNOSIS — M47.816 SPONDYLOSIS W/OUT MYELOPATHY OR RADICULOPATHY, LUMBAR REGION: ICD-10-CM

## 2024-10-21 PROCEDURE — 64493 INJ PARAVERT F JNT L/S 1 LEV: CPT | Mod: 50

## 2024-10-21 PROCEDURE — 64494 INJ PARAVERT F JNT L/S 2 LEV: CPT | Mod: 50

## 2024-10-21 PROCEDURE — 00630 ANES PX LUMBAR REGION NOS: CPT | Mod: QZ,P2

## 2024-11-05 ENCOUNTER — APPOINTMENT (OUTPATIENT)
Dept: PAIN MANAGEMENT | Facility: CLINIC | Age: 54
End: 2024-11-05
Payer: COMMERCIAL

## 2024-11-05 DIAGNOSIS — M43.10 SPONDYLOLISTHESIS, SITE UNSPECIFIED: ICD-10-CM

## 2024-11-05 DIAGNOSIS — M48.07 SPINAL STENOSIS, LUMBOSACRAL REGION: ICD-10-CM

## 2024-11-05 DIAGNOSIS — M47.816 SPONDYLOSIS W/OUT MYELOPATHY OR RADICULOPATHY, LUMBAR REGION: ICD-10-CM

## 2024-11-05 PROCEDURE — 99214 OFFICE O/P EST MOD 30 MIN: CPT

## 2025-01-31 ENCOUNTER — APPOINTMENT (OUTPATIENT)
Facility: CLINIC | Age: 55
End: 2025-01-31

## 2025-01-31 ENCOUNTER — APPOINTMENT (OUTPATIENT)
Dept: PAIN MANAGEMENT | Facility: CLINIC | Age: 55
End: 2025-01-31

## 2025-02-03 ENCOUNTER — APPOINTMENT (OUTPATIENT)
Facility: CLINIC | Age: 55
End: 2025-02-03

## 2025-02-03 ENCOUNTER — APPOINTMENT (OUTPATIENT)
Dept: PAIN MANAGEMENT | Facility: CLINIC | Age: 55
End: 2025-02-03

## 2025-02-10 ENCOUNTER — APPOINTMENT (OUTPATIENT)
Facility: CLINIC | Age: 55
End: 2025-02-10

## 2025-02-10 ENCOUNTER — APPOINTMENT (OUTPATIENT)
Dept: PAIN MANAGEMENT | Facility: CLINIC | Age: 55
End: 2025-02-10

## 2025-03-11 ENCOUNTER — APPOINTMENT (OUTPATIENT)
Dept: PAIN MANAGEMENT | Facility: CLINIC | Age: 55
End: 2025-03-11

## 2025-06-10 ENCOUNTER — OUTPATIENT (OUTPATIENT)
Dept: OUTPATIENT SERVICES | Facility: HOSPITAL | Age: 55
LOS: 1 days | End: 2025-06-10
Payer: COMMERCIAL

## 2025-06-10 DIAGNOSIS — M54.59 OTHER LOW BACK PAIN: ICD-10-CM

## 2025-06-10 DIAGNOSIS — Z00.8 ENCOUNTER FOR OTHER GENERAL EXAMINATION: ICD-10-CM

## 2025-06-10 PROCEDURE — 72148 MRI LUMBAR SPINE W/O DYE: CPT

## 2025-06-10 PROCEDURE — 72148 MRI LUMBAR SPINE W/O DYE: CPT | Mod: 26

## 2025-06-11 DIAGNOSIS — M54.59 OTHER LOW BACK PAIN: ICD-10-CM

## 2025-07-07 ENCOUNTER — INPATIENT (INPATIENT)
Facility: HOSPITAL | Age: 55
LOS: 1 days | Discharge: HOME | DRG: 392 | End: 2025-07-09
Attending: SURGERY | Admitting: SURGERY
Payer: COMMERCIAL

## 2025-07-07 VITALS
HEART RATE: 60 BPM | RESPIRATION RATE: 16 BRPM | DIASTOLIC BLOOD PRESSURE: 91 MMHG | HEIGHT: 68 IN | SYSTOLIC BLOOD PRESSURE: 134 MMHG | TEMPERATURE: 98 F | WEIGHT: 220.46 LBS | OXYGEN SATURATION: 97 %

## 2025-07-07 DIAGNOSIS — K57.92 DIVERTICULITIS OF INTESTINE, PART UNSPECIFIED, WITHOUT PERFORATION OR ABSCESS WITHOUT BLEEDING: ICD-10-CM

## 2025-07-07 LAB
ALBUMIN SERPL ELPH-MCNC: 4.3 G/DL — SIGNIFICANT CHANGE UP (ref 3.5–5.2)
ALP SERPL-CCNC: 47 U/L — SIGNIFICANT CHANGE UP (ref 30–115)
ALT FLD-CCNC: 33 U/L — SIGNIFICANT CHANGE UP (ref 0–41)
ANION GAP SERPL CALC-SCNC: 10 MMOL/L — SIGNIFICANT CHANGE UP (ref 7–14)
ANION GAP SERPL CALC-SCNC: 11 MMOL/L — SIGNIFICANT CHANGE UP (ref 7–14)
APPEARANCE UR: CLEAR — SIGNIFICANT CHANGE UP
APTT BLD: 30.8 SEC — SIGNIFICANT CHANGE UP (ref 27–39.2)
AST SERPL-CCNC: 21 U/L — SIGNIFICANT CHANGE UP (ref 0–41)
BASOPHILS # BLD AUTO: 0.03 K/UL — SIGNIFICANT CHANGE UP (ref 0–0.2)
BASOPHILS # BLD AUTO: 0.05 K/UL — SIGNIFICANT CHANGE UP (ref 0–0.2)
BASOPHILS NFR BLD AUTO: 0.5 % — SIGNIFICANT CHANGE UP (ref 0–1)
BASOPHILS NFR BLD AUTO: 0.8 % — SIGNIFICANT CHANGE UP (ref 0–1)
BILIRUB DIRECT SERPL-MCNC: 0.2 MG/DL — SIGNIFICANT CHANGE UP (ref 0–0.3)
BILIRUB INDIRECT FLD-MCNC: 0.3 MG/DL — SIGNIFICANT CHANGE UP (ref 0.2–1.2)
BILIRUB SERPL-MCNC: 0.5 MG/DL — SIGNIFICANT CHANGE UP (ref 0.2–1.2)
BILIRUB UR-MCNC: NEGATIVE — SIGNIFICANT CHANGE UP
BLD GP AB SCN SERPL QL: SIGNIFICANT CHANGE UP
BUN SERPL-MCNC: 14 MG/DL — SIGNIFICANT CHANGE UP (ref 10–20)
BUN SERPL-MCNC: 19 MG/DL — SIGNIFICANT CHANGE UP (ref 10–20)
CALCIUM SERPL-MCNC: 8.6 MG/DL — SIGNIFICANT CHANGE UP (ref 8.4–10.5)
CALCIUM SERPL-MCNC: 9.1 MG/DL — SIGNIFICANT CHANGE UP (ref 8.4–10.5)
CHLORIDE SERPL-SCNC: 102 MMOL/L — SIGNIFICANT CHANGE UP (ref 98–110)
CHLORIDE SERPL-SCNC: 98 MMOL/L — SIGNIFICANT CHANGE UP (ref 98–110)
CO2 SERPL-SCNC: 24 MMOL/L — SIGNIFICANT CHANGE UP (ref 17–32)
CO2 SERPL-SCNC: 27 MMOL/L — SIGNIFICANT CHANGE UP (ref 17–32)
COLOR SPEC: YELLOW — SIGNIFICANT CHANGE UP
CREAT SERPL-MCNC: 1.1 MG/DL — SIGNIFICANT CHANGE UP (ref 0.7–1.5)
CREAT SERPL-MCNC: 1.1 MG/DL — SIGNIFICANT CHANGE UP (ref 0.7–1.5)
DIFF PNL FLD: NEGATIVE — SIGNIFICANT CHANGE UP
EGFR: 80 ML/MIN/1.73M2 — SIGNIFICANT CHANGE UP
EOSINOPHIL # BLD AUTO: 0.18 K/UL — SIGNIFICANT CHANGE UP (ref 0–0.7)
EOSINOPHIL # BLD AUTO: 0.23 K/UL — SIGNIFICANT CHANGE UP (ref 0–0.7)
EOSINOPHIL NFR BLD AUTO: 3 % — SIGNIFICANT CHANGE UP (ref 0–8)
EOSINOPHIL NFR BLD AUTO: 3.5 % — SIGNIFICANT CHANGE UP (ref 0–8)
GLUCOSE SERPL-MCNC: 105 MG/DL — HIGH (ref 70–99)
GLUCOSE SERPL-MCNC: 88 MG/DL — SIGNIFICANT CHANGE UP (ref 70–99)
GLUCOSE UR QL: NEGATIVE MG/DL — SIGNIFICANT CHANGE UP
HCT VFR BLD CALC: 44.4 % — SIGNIFICANT CHANGE UP (ref 42–52)
HCT VFR BLD CALC: 44.7 % — SIGNIFICANT CHANGE UP (ref 42–52)
HGB BLD-MCNC: 15.1 G/DL — SIGNIFICANT CHANGE UP (ref 14–18)
HGB BLD-MCNC: 15.2 G/DL — SIGNIFICANT CHANGE UP (ref 14–18)
IMM GRANULOCYTES NFR BLD AUTO: 0.3 % — SIGNIFICANT CHANGE UP (ref 0.1–0.3)
IMM GRANULOCYTES NFR BLD AUTO: 0.3 % — SIGNIFICANT CHANGE UP (ref 0.1–0.3)
INR BLD: 0.91 RATIO — SIGNIFICANT CHANGE UP (ref 0.65–1.3)
KETONES UR QL: NEGATIVE MG/DL — SIGNIFICANT CHANGE UP
LACTATE SERPL-SCNC: 1 MMOL/L — SIGNIFICANT CHANGE UP (ref 0.7–2)
LEUKOCYTE ESTERASE UR-ACNC: NEGATIVE — SIGNIFICANT CHANGE UP
LIDOCAIN IGE QN: 26 U/L — SIGNIFICANT CHANGE UP (ref 7–60)
LYMPHOCYTES # BLD AUTO: 1.67 K/UL — SIGNIFICANT CHANGE UP (ref 1.2–3.4)
LYMPHOCYTES # BLD AUTO: 1.71 K/UL — SIGNIFICANT CHANGE UP (ref 1.2–3.4)
LYMPHOCYTES # BLD AUTO: 25.9 % — SIGNIFICANT CHANGE UP (ref 20.5–51.1)
LYMPHOCYTES # BLD AUTO: 27.7 % — SIGNIFICANT CHANGE UP (ref 20.5–51.1)
MAGNESIUM SERPL-MCNC: 2 MG/DL — SIGNIFICANT CHANGE UP (ref 1.8–2.4)
MCHC RBC-ENTMCNC: 28.8 PG — SIGNIFICANT CHANGE UP (ref 27–31)
MCHC RBC-ENTMCNC: 28.9 PG — SIGNIFICANT CHANGE UP (ref 27–31)
MCHC RBC-ENTMCNC: 33.8 G/DL — SIGNIFICANT CHANGE UP (ref 32–37)
MCHC RBC-ENTMCNC: 34.2 G/DL — SIGNIFICANT CHANGE UP (ref 32–37)
MCV RBC AUTO: 84.4 FL — SIGNIFICANT CHANGE UP (ref 80–94)
MCV RBC AUTO: 85.3 FL — SIGNIFICANT CHANGE UP (ref 80–94)
MONOCYTES # BLD AUTO: 0.56 K/UL — SIGNIFICANT CHANGE UP (ref 0.1–0.6)
MONOCYTES # BLD AUTO: 0.57 K/UL — SIGNIFICANT CHANGE UP (ref 0.1–0.6)
MONOCYTES NFR BLD AUTO: 8.6 % — SIGNIFICANT CHANGE UP (ref 1.7–9.3)
MONOCYTES NFR BLD AUTO: 9.3 % — SIGNIFICANT CHANGE UP (ref 1.7–9.3)
NEUTROPHILS # BLD AUTO: 3.57 K/UL — SIGNIFICANT CHANGE UP (ref 1.4–6.5)
NEUTROPHILS # BLD AUTO: 4.02 K/UL — SIGNIFICANT CHANGE UP (ref 1.4–6.5)
NEUTROPHILS NFR BLD AUTO: 59.2 % — SIGNIFICANT CHANGE UP (ref 42.2–75.2)
NEUTROPHILS NFR BLD AUTO: 60.9 % — SIGNIFICANT CHANGE UP (ref 42.2–75.2)
NITRITE UR-MCNC: NEGATIVE — SIGNIFICANT CHANGE UP
NRBC BLD AUTO-RTO: 0 /100 WBCS — SIGNIFICANT CHANGE UP (ref 0–0)
NRBC BLD AUTO-RTO: 0 /100 WBCS — SIGNIFICANT CHANGE UP (ref 0–0)
PH UR: 7 — SIGNIFICANT CHANGE UP (ref 5–8)
PHOSPHATE SERPL-MCNC: 3.2 MG/DL — SIGNIFICANT CHANGE UP (ref 2.1–4.9)
PLATELET # BLD AUTO: 135 K/UL — SIGNIFICANT CHANGE UP (ref 130–400)
PLATELET # BLD AUTO: 136 K/UL — SIGNIFICANT CHANGE UP (ref 130–400)
PMV BLD: 10.6 FL — HIGH (ref 7.4–10.4)
PMV BLD: 10.7 FL — HIGH (ref 7.4–10.4)
POTASSIUM SERPL-MCNC: 4.2 MMOL/L — SIGNIFICANT CHANGE UP (ref 3.5–5)
POTASSIUM SERPL-MCNC: 4.5 MMOL/L — SIGNIFICANT CHANGE UP (ref 3.5–5)
POTASSIUM SERPL-SCNC: 4.2 MMOL/L — SIGNIFICANT CHANGE UP (ref 3.5–5)
POTASSIUM SERPL-SCNC: 4.5 MMOL/L — SIGNIFICANT CHANGE UP (ref 3.5–5)
PROT SERPL-MCNC: 7.1 G/DL — SIGNIFICANT CHANGE UP (ref 6–8)
PROT UR-MCNC: NEGATIVE MG/DL — SIGNIFICANT CHANGE UP
PROTHROM AB SERPL-ACNC: 10.7 SEC — SIGNIFICANT CHANGE UP (ref 9.95–12.87)
RBC # BLD: 5.24 M/UL — SIGNIFICANT CHANGE UP (ref 4.7–6.1)
RBC # BLD: 5.26 M/UL — SIGNIFICANT CHANGE UP (ref 4.7–6.1)
RBC # FLD: 13.1 % — SIGNIFICANT CHANGE UP (ref 11.5–14.5)
RBC # FLD: 13.1 % — SIGNIFICANT CHANGE UP (ref 11.5–14.5)
SODIUM SERPL-SCNC: 135 MMOL/L — SIGNIFICANT CHANGE UP (ref 135–146)
SODIUM SERPL-SCNC: 137 MMOL/L — SIGNIFICANT CHANGE UP (ref 135–146)
SP GR SPEC: >1.03 — HIGH (ref 1–1.03)
UROBILINOGEN FLD QL: 0.2 MG/DL — SIGNIFICANT CHANGE UP (ref 0.2–1)
WBC # BLD: 6.03 K/UL — SIGNIFICANT CHANGE UP (ref 4.8–10.8)
WBC # BLD: 6.6 K/UL — SIGNIFICANT CHANGE UP (ref 4.8–10.8)
WBC # FLD AUTO: 6.03 K/UL — SIGNIFICANT CHANGE UP (ref 4.8–10.8)
WBC # FLD AUTO: 6.6 K/UL — SIGNIFICANT CHANGE UP (ref 4.8–10.8)

## 2025-07-07 PROCEDURE — 85025 COMPLETE CBC W/AUTO DIFF WBC: CPT

## 2025-07-07 PROCEDURE — 74177 CT ABD & PELVIS W/CONTRAST: CPT | Mod: 26

## 2025-07-07 PROCEDURE — 80048 BASIC METABOLIC PNL TOTAL CA: CPT

## 2025-07-07 PROCEDURE — 83735 ASSAY OF MAGNESIUM: CPT

## 2025-07-07 PROCEDURE — 99223 1ST HOSP IP/OBS HIGH 75: CPT

## 2025-07-07 PROCEDURE — 84100 ASSAY OF PHOSPHORUS: CPT

## 2025-07-07 PROCEDURE — 99291 CRITICAL CARE FIRST HOUR: CPT

## 2025-07-07 PROCEDURE — 36415 COLL VENOUS BLD VENIPUNCTURE: CPT

## 2025-07-07 RX ORDER — ACETAMINOPHEN 500 MG/5ML
1000 LIQUID (ML) ORAL ONCE
Refills: 0 | Status: COMPLETED | OUTPATIENT
Start: 2025-07-07 | End: 2025-07-07

## 2025-07-07 RX ORDER — DEXLANSOPRAZOLE 60 MG/1
1 CAPSULE, DELAYED RELEASE ORAL
Refills: 0 | DISCHARGE

## 2025-07-07 RX ORDER — METRONIDAZOLE 250 MG
500 TABLET ORAL ONCE
Refills: 0 | Status: COMPLETED | OUTPATIENT
Start: 2025-07-07 | End: 2025-07-07

## 2025-07-07 RX ORDER — ONDANSETRON HCL/PF 4 MG/2 ML
4 VIAL (ML) INJECTION ONCE
Refills: 0 | Status: COMPLETED | OUTPATIENT
Start: 2025-07-07 | End: 2025-07-07

## 2025-07-07 RX ORDER — METOPROLOL SUCCINATE 50 MG/1
5 TABLET, EXTENDED RELEASE ORAL EVERY 12 HOURS
Refills: 0 | Status: DISCONTINUED | OUTPATIENT
Start: 2025-07-07 | End: 2025-07-08

## 2025-07-07 RX ORDER — PIPERACILLIN-TAZO-DEXTROSE,ISO 3.375G/5
3.38 IV SOLUTION, PIGGYBACK PREMIX FROZEN(ML) INTRAVENOUS ONCE
Refills: 0 | Status: COMPLETED | OUTPATIENT
Start: 2025-07-07 | End: 2025-07-07

## 2025-07-07 RX ORDER — PIPERACILLIN-TAZO-DEXTROSE,ISO 3.375G/5
3.38 IV SOLUTION, PIGGYBACK PREMIX FROZEN(ML) INTRAVENOUS EVERY 8 HOURS
Refills: 0 | Status: DISCONTINUED | OUTPATIENT
Start: 2025-07-07 | End: 2025-07-09

## 2025-07-07 RX ORDER — CEFEPIME 2 G/20ML
2000 INJECTION, POWDER, FOR SOLUTION INTRAVENOUS ONCE
Refills: 0 | Status: COMPLETED | OUTPATIENT
Start: 2025-07-07 | End: 2025-07-07

## 2025-07-07 RX ORDER — KETOROLAC TROMETHAMINE 30 MG/ML
15 INJECTION, SOLUTION INTRAMUSCULAR; INTRAVENOUS EVERY 6 HOURS
Refills: 0 | Status: DISCONTINUED | OUTPATIENT
Start: 2025-07-07 | End: 2025-07-09

## 2025-07-07 RX ORDER — ENOXAPARIN SODIUM 100 MG/ML
40 INJECTION SUBCUTANEOUS EVERY 24 HOURS
Refills: 0 | Status: DISCONTINUED | OUTPATIENT
Start: 2025-07-07 | End: 2025-07-09

## 2025-07-07 RX ORDER — SODIUM CHLORIDE 9 G/1000ML
1000 INJECTION, SOLUTION INTRAVENOUS
Refills: 0 | Status: DISCONTINUED | OUTPATIENT
Start: 2025-07-07 | End: 2025-07-09

## 2025-07-07 RX ADMIN — Medication 6 MILLIGRAM(S): at 09:52

## 2025-07-07 RX ADMIN — Medication 100 MILLIGRAM(S): at 10:55

## 2025-07-07 RX ADMIN — Medication 6 MILLIGRAM(S): at 08:52

## 2025-07-07 RX ADMIN — Medication 1000 MILLILITER(S): at 09:52

## 2025-07-07 RX ADMIN — Medication 4 MILLIGRAM(S): at 08:51

## 2025-07-07 RX ADMIN — CEFEPIME 100 MILLIGRAM(S): 2 INJECTION, POWDER, FOR SOLUTION INTRAVENOUS at 10:20

## 2025-07-07 RX ADMIN — Medication 200 GRAM(S): at 14:35

## 2025-07-07 RX ADMIN — METOPROLOL SUCCINATE 5 MILLIGRAM(S): 50 TABLET, EXTENDED RELEASE ORAL at 18:18

## 2025-07-07 RX ADMIN — SODIUM CHLORIDE 140 MILLILITER(S): 9 INJECTION, SOLUTION INTRAVENOUS at 14:51

## 2025-07-07 RX ADMIN — Medication 400 MILLIGRAM(S): at 22:45

## 2025-07-07 RX ADMIN — Medication 1000 MILLILITER(S): at 08:52

## 2025-07-07 RX ADMIN — Medication 25 GRAM(S): at 18:18

## 2025-07-07 RX ADMIN — Medication 1000 MILLIGRAM(S): at 23:15

## 2025-07-07 RX ADMIN — Medication 40 MILLIGRAM(S): at 14:35

## 2025-07-07 NOTE — H&P ADULT - NSHPPHYSICALEXAM_GEN_ALL_CORE
PHYSICAL EXAM:  General: NAD, AAOx3, calm and cooperative  HEENT: NCAT, EOMI, Trachea ML, Neck supple  Cardiac: RRR  Respiratory: On RA, normal resp effort, chest rise equal bilaterally, no labored breathing  Abdomen: Soft, non-distended, non-tender  Musculoskeletal: Strength 5/5 BL UE/LE, ROM intact, compartments soft  Skin: Warm/dry, normal color, no jaundice

## 2025-07-07 NOTE — H&P ADULT - ATTENDING COMMENTS
Seen on 7/7/25    ACS Attending Note Attestation    Patient is examined and evaluated at the bedside with the residents/PAs. Treatment plan discussed with the team, nurses, and consulting physicians and consulting teams. Medications, radiological studies and all other relevant studies reviewed. I reviewed the resident/PA note and agreed with above assessment and plan with following additions and corrections. Time devoted to teaching and to any procedures I billed separately is not included.     Physical Exam:   I independently performed a medically appropriate exam. The exam was notable for    abd: left lower quadrant abdominal pain, no rebound, no tenderness,     Labs: I have reviewed the labs on system  normal WBC     Radiology: I have reviewed and interpreted the imaging  CT shows complicated diverticulitis with small amount of free air and melissa sigmoid inflammation, no drainable collection     Assessment:   complicated diverticulitis, clinically stable   discussed with patient the ct findings and the clinical stability, patient is in agreement with non op management,     Plan:	  NPO  IV fluids  IV antibiotics  serial abdominal exam  outpatient colonoscopy      [ 75]       minutes spent on total encounter. The necessity of the time above spent during the encounter on this date of service as described above.    Juliette Dunlap MD  Trauma/ACS/Surgical Critical care Attending

## 2025-07-07 NOTE — H&P ADULT - NSHPLABSRESULTS_GEN_ALL_CORE
< from: CT Abdomen and Pelvis w/ IV Cont (07.07.25 @ 09:04) >    Multifocal diverticulitis as described. Free peritoneal air is noted   which is highly concerning for perforation.    No bowel obstruction.    < end of copied text >

## 2025-07-07 NOTE — ED ADULT NURSE NOTE - OBJECTIVE STATEMENT
pt presents for left sided abd pain after lifting 40-50 lb object at work. pt a/ox4, more pain with activity. pt a/ox4

## 2025-07-07 NOTE — ED PROVIDER NOTE - OBJECTIVE STATEMENT
54-year-old male with history of hypertension, high cholesterol, spinal stenosis, smoker presents to the ED status post lifting a bucket of compound and began having sharp left lower quadrant abdominal pain.  Patient denies any nausea, vomiting, diarrhea, fever, chills, chest pain or shortness of breath.

## 2025-07-07 NOTE — H&P ADULT - HISTORY OF PRESENT ILLNESS
54 y.o. M w/ PMH of HTN, HLD, spinal stenosis, R inguinal hernia repair in 1995 (Dr. Lawler) who came in to the hospital due to sharp pain he felt after lifting 40 Lb box.  The pain is located in LLQ, doesn't radiate to the back. Pt mentioned having similar pain about 2 month ago but not that sharp and acute. Pt denies any N/V. On PE pt denies any pain, nontender, nondistended, non peritonitic.

## 2025-07-07 NOTE — H&P ADULT - ASSESSMENT
ASSESSMENT:  54 y.o. M w/ PMH of HTN, HLD, spinal stenosis, R inguinal hernia repair in 1995 (Dr. Lawler) who came in to the hospital due to sharp pain he felt after lifting 40 Lb box. Physical exam findings, imaging, and labs as documented above.     PLAN:  - Admit to 4C under Dr. Dunlap  - NPO + IVF  - Serial abd exam  - Abx   - DVT and GI ppx  - Ambulation as tolerating    Lines/Tubes: PIV, Midline, Central Line, A-Line, Chest tubes, Rashi/KENNEDI drains, [place] Catheter.    Above plan discussed with Attending Surgeon Dr. dunlap, patient, and ED team  07-07-25 @ 11:44      EGS SPECTRA: [id number]

## 2025-07-07 NOTE — ED PROVIDER NOTE - CLINICAL SUMMARY MEDICAL DECISION MAKING FREE TEXT BOX
pt with abdominal pain, perforated diverticulitis    CT reviewed DW Dr. Dunlap    Appropriate medications for patient's presenting complaints were ordered and effects were reassessed.  Patient's external records were reviewed.     Escalation to admission/observation was considered.  At this time, patient requires inpatient hospitalization .    Sepsis/ IVabx

## 2025-07-08 LAB
ANION GAP SERPL CALC-SCNC: 11 MMOL/L — SIGNIFICANT CHANGE UP (ref 7–14)
BASOPHILS # BLD AUTO: 0.02 K/UL — SIGNIFICANT CHANGE UP (ref 0–0.2)
BASOPHILS NFR BLD AUTO: 0.4 % — SIGNIFICANT CHANGE UP (ref 0–1)
BUN SERPL-MCNC: 9 MG/DL — LOW (ref 10–20)
CALCIUM SERPL-MCNC: 8.8 MG/DL — SIGNIFICANT CHANGE UP (ref 8.4–10.5)
CHLORIDE SERPL-SCNC: 103 MMOL/L — SIGNIFICANT CHANGE UP (ref 98–110)
CO2 SERPL-SCNC: 25 MMOL/L — SIGNIFICANT CHANGE UP (ref 17–32)
CREAT SERPL-MCNC: 1.1 MG/DL — SIGNIFICANT CHANGE UP (ref 0.7–1.5)
EGFR: 80 ML/MIN/1.73M2 — SIGNIFICANT CHANGE UP
EGFR: 80 ML/MIN/1.73M2 — SIGNIFICANT CHANGE UP
EOSINOPHIL # BLD AUTO: 0.24 K/UL — SIGNIFICANT CHANGE UP (ref 0–0.7)
EOSINOPHIL NFR BLD AUTO: 4.5 % — SIGNIFICANT CHANGE UP (ref 0–8)
GLUCOSE SERPL-MCNC: 69 MG/DL — LOW (ref 70–99)
HCT VFR BLD CALC: 43.6 % — SIGNIFICANT CHANGE UP (ref 42–52)
HGB BLD-MCNC: 14.5 G/DL — SIGNIFICANT CHANGE UP (ref 14–18)
IMM GRANULOCYTES NFR BLD AUTO: 0.2 % — SIGNIFICANT CHANGE UP (ref 0.1–0.3)
LYMPHOCYTES # BLD AUTO: 1.55 K/UL — SIGNIFICANT CHANGE UP (ref 1.2–3.4)
LYMPHOCYTES # BLD AUTO: 28.9 % — SIGNIFICANT CHANGE UP (ref 20.5–51.1)
MAGNESIUM SERPL-MCNC: 2.2 MG/DL — SIGNIFICANT CHANGE UP (ref 1.8–2.4)
MCHC RBC-ENTMCNC: 28.9 PG — SIGNIFICANT CHANGE UP (ref 27–31)
MCHC RBC-ENTMCNC: 33.3 G/DL — SIGNIFICANT CHANGE UP (ref 32–37)
MCV RBC AUTO: 87 FL — SIGNIFICANT CHANGE UP (ref 80–94)
MONOCYTES # BLD AUTO: 0.46 K/UL — SIGNIFICANT CHANGE UP (ref 0.1–0.6)
MONOCYTES NFR BLD AUTO: 8.6 % — SIGNIFICANT CHANGE UP (ref 1.7–9.3)
NEUTROPHILS # BLD AUTO: 3.09 K/UL — SIGNIFICANT CHANGE UP (ref 1.4–6.5)
NEUTROPHILS NFR BLD AUTO: 57.4 % — SIGNIFICANT CHANGE UP (ref 42.2–75.2)
NRBC BLD AUTO-RTO: 0 /100 WBCS — SIGNIFICANT CHANGE UP (ref 0–0)
PHOSPHATE SERPL-MCNC: 3.3 MG/DL — SIGNIFICANT CHANGE UP (ref 2.1–4.9)
PLATELET # BLD AUTO: 142 K/UL — SIGNIFICANT CHANGE UP (ref 130–400)
PMV BLD: 11 FL — HIGH (ref 7.4–10.4)
POTASSIUM SERPL-MCNC: 4 MMOL/L — SIGNIFICANT CHANGE UP (ref 3.5–5)
POTASSIUM SERPL-SCNC: 4 MMOL/L — SIGNIFICANT CHANGE UP (ref 3.5–5)
RBC # BLD: 5.01 M/UL — SIGNIFICANT CHANGE UP (ref 4.7–6.1)
RBC # FLD: 13.2 % — SIGNIFICANT CHANGE UP (ref 11.5–14.5)
SODIUM SERPL-SCNC: 139 MMOL/L — SIGNIFICANT CHANGE UP (ref 135–146)
WBC # BLD: 5.37 K/UL — SIGNIFICANT CHANGE UP (ref 4.8–10.8)
WBC # FLD AUTO: 5.37 K/UL — SIGNIFICANT CHANGE UP (ref 4.8–10.8)

## 2025-07-08 PROCEDURE — 99232 SBSQ HOSP IP/OBS MODERATE 35: CPT

## 2025-07-08 RX ORDER — ACETAMINOPHEN 500 MG/5ML
1000 LIQUID (ML) ORAL EVERY 6 HOURS
Refills: 0 | Status: DISCONTINUED | OUTPATIENT
Start: 2025-07-08 | End: 2025-07-09

## 2025-07-08 RX ORDER — AMLODIPINE BESYLATE 10 MG/1
5 TABLET ORAL AT BEDTIME
Refills: 0 | Status: DISCONTINUED | OUTPATIENT
Start: 2025-07-08 | End: 2025-07-09

## 2025-07-08 RX ORDER — ASPIRIN 325 MG
81 TABLET ORAL DAILY
Refills: 0 | Status: DISCONTINUED | OUTPATIENT
Start: 2025-07-08 | End: 2025-07-09

## 2025-07-08 RX ADMIN — Medication 81 MILLIGRAM(S): at 11:03

## 2025-07-08 RX ADMIN — SODIUM CHLORIDE 140 MILLILITER(S): 9 INJECTION, SOLUTION INTRAVENOUS at 17:31

## 2025-07-08 RX ADMIN — Medication 25 GRAM(S): at 17:31

## 2025-07-08 RX ADMIN — Medication 25 GRAM(S): at 01:51

## 2025-07-08 RX ADMIN — Medication 400 MILLIGRAM(S): at 11:07

## 2025-07-08 RX ADMIN — SODIUM CHLORIDE 140 MILLILITER(S): 9 INJECTION, SOLUTION INTRAVENOUS at 21:33

## 2025-07-08 RX ADMIN — Medication 400 MILLIGRAM(S): at 23:06

## 2025-07-08 RX ADMIN — Medication 1000 MILLIGRAM(S): at 23:36

## 2025-07-08 RX ADMIN — Medication 400 MILLIGRAM(S): at 17:31

## 2025-07-08 RX ADMIN — Medication 25 GRAM(S): at 10:59

## 2025-07-08 RX ADMIN — AMLODIPINE BESYLATE 5 MILLIGRAM(S): 10 TABLET ORAL at 21:33

## 2025-07-08 RX ADMIN — Medication 1000 MILLIGRAM(S): at 11:37

## 2025-07-08 RX ADMIN — Medication 40 MILLIGRAM(S): at 11:05

## 2025-07-08 RX ADMIN — SODIUM CHLORIDE 140 MILLILITER(S): 9 INJECTION, SOLUTION INTRAVENOUS at 00:32

## 2025-07-08 RX ADMIN — Medication 1 APPLICATION(S): at 05:50

## 2025-07-08 NOTE — PROGRESS NOTE ADULT - ATTENDING COMMENTS
ACS Attending Note Attestation    Patient is examined and evaluated at the bedside with the residents/PAs. Treatment plan discussed with the team, nurses, and consulting physicians and consulting teams. Medications, radiological studies and all other relevant studies reviewed. I reviewed the resident/PA note and agreed with above assessment and plan with following additions and corrections. Time devoted to teaching and to any procedures I billed separately is not included.     Physical Exam:   I independently performed a medically appropriate exam. The exam was notable for    abd: soft, mild left lower discomfort, not distended    Labs: I have reviewed the labs on system  normal WBC     Radiology: I have reviewed and interpreted the imaging  CT shows complicated diverticulitis with small amount of free air and melissa sigmoid inflammation,    Assessment:   complicated diverticulitis, responding to IV antibiotics    Plan:	  clears  home meds as able   IV fluids  IV antibiotics  outpatient colonoscopy in 6- 8 weeks   chemical DVT ppx    [ 35]       minutes spent on total encounter. The necessity of the time above spent during the encounter on this date of service as described above.    Juliette Dunlap MD  Trauma/ACS/Surgical Critical care Attending .

## 2025-07-08 NOTE — PROGRESS NOTE ADULT - ASSESSMENT
ASSESSMENT:  54 y.o. M w/ PMH of HTN, HLD, spinal stenosis, R inguinal hernia repair in 1995 (Dr. Lawler) who came in to the hospital due to sharp pain he felt after lifting 40 Lb box. Physical exam findings, imaging, and labs as documented above.     PLAN:    - NPO + IVF  - Serial abd exam  - Abx   - DVT and GI ppx  - Ambulation as tolerating

## 2025-07-08 NOTE — PROGRESS NOTE ADULT - SUBJECTIVE AND OBJECTIVE BOX
GENERAL SURGERY PROGRESS NOTE    Patient: LISA VELAZQUEZ , 54y ([birth year])Male   MRN: [id number]  Location: [place] [address] A  Visit: 07-07-25 Inpatient  Date: 07-08-25 @ 00:19    Hospital Day #: 1    Procedure/Dx/Injuries: PERFORATED DIVERTICULITIS    Events of past 24 hours:  Patient seen and examined at bedside. Patient endorses that abdominal pain has lessened. Patient denies N/V.     PAST MEDICAL & SURGICAL HISTORY:      Vitals:   T(F): 98 (07-07-25 @ 23:59), Max: 98 (07-07-25 @ 23:59)  HR: 57 (07-07-25 @ 23:59)  BP: 115/69 (07-07-25 @ 23:59)  RR: 18 (07-07-25 @ 23:59)  SpO2: 95% (07-07-25 @ 23:59)      Diet, NPO      Fluids: lactated ringers.: Solution, 1000 milliLiter(s) infuse at 140 mL/Hr      I & O's:    Physical Exam: PHYSICAL EXAM:  General: NAD, AAOx3, calm and cooperative  HEENT: N Trachea ML, Neck supple  Cardiac: RRR  Respiratory: chest rise equal bilaterally  Abdomen: Soft, non-distended, tender to LLQ, non peritonitic   Musculoskeletal: Strength 5/5 BL UE/LE, ROM intact, compartments soft  Skin: Warm/dry, normal color, no jaundice      MEDICATIONS  (STANDING):  chlorhexidine 2% Cloths 1 Application(s) Topical <User Schedule>  enoxaparin Injectable 40 milliGRAM(s) SubCutaneous every 24 hours  lactated ringers. 1000 milliLiter(s) (140 mL/Hr) IV Continuous <Continuous>  metoprolol tartrate Injectable 5 milliGRAM(s) IV Push every 12 hours  pantoprazole  Injectable 40 milliGRAM(s) IV Push daily  piperacillin/tazobactam IVPB.. 3.375 Gram(s) IV Intermittent every 8 hours    MEDICATIONS  (PRN):  ketorolac   Injectable 15 milliGRAM(s) IV Push every 6 hours PRN Moderate Pain (4 - 6)      DVT PROPHYLAXIS: enoxaparin Injectable 40 milliGRAM(s) SubCutaneous every 24 hours    GI PROPHYLAXIS: pantoprazole  Injectable 40 milliGRAM(s) IV Push daily    ANTICOAGULATION:   ANTIBIOTICS:  piperacillin/tazobactam IVPB.. 3.375 Gram(s)            LAB/STUDIES:  Labs:  CAPILLARY BLOOD GLUCOSE                              15.1   6.60  )-----------( 135      ( 07 Jul 2025 20:48 )             44.7       Auto Immature Granulocyte %: 0.3 % (07-07-25 @ 20:48)  Auto Immature Granulocyte %: 0.3 % (07-07-25 @ 08:45)    07-07    135  |  98  |  14  ----------------------------<  88  4.5   |  27  |  1.1      Calcium: 8.6 mg/dL (07-07-25 @ 20:48)      LFTs:             7.1  | 0.5  | 21       ------------------[47      ( 07 Jul 2025 08:45 )  4.3  | 0.2  | 33          Lipase:26     Amylase:x         Lactate, Blood: 1.0 mmol/L (07-07-25 @ 08:45)      Coags:     10.70  ----< 0.91    ( 07 Jul 2025 10:11 )     30.8                Urinalysis Basic - ( 07 Jul 2025 20:48 )    Color: x / Appearance: x / SG: x / pH: x  Gluc: 88 mg/dL / Ketone: x  / Bili: x / Urobili: x   Blood: x / Protein: x / Nitrite: x   Leuk Esterase: x / RBC: x / WBC x   Sq Epi: x / Non Sq Epi: x / Bacteria: x

## 2025-07-09 ENCOUNTER — TRANSCRIPTION ENCOUNTER (OUTPATIENT)
Age: 55
End: 2025-07-09

## 2025-07-09 VITALS
HEART RATE: 65 BPM | SYSTOLIC BLOOD PRESSURE: 123 MMHG | TEMPERATURE: 98 F | DIASTOLIC BLOOD PRESSURE: 78 MMHG | OXYGEN SATURATION: 96 % | RESPIRATION RATE: 18 BRPM

## 2025-07-09 PROCEDURE — 99239 HOSP IP/OBS DSCHRG MGMT >30: CPT

## 2025-07-09 RX ORDER — POLYETHYLENE GLYCOL 3350 17 G/17G
17 POWDER, FOR SOLUTION ORAL DAILY
Refills: 0 | Status: DISCONTINUED | OUTPATIENT
Start: 2025-07-09 | End: 2025-07-09

## 2025-07-09 RX ORDER — METRONIDAZOLE 250 MG
1 TABLET ORAL
Qty: 14 | Refills: 0
Start: 2025-07-09 | End: 2025-07-15

## 2025-07-09 RX ORDER — EZETIMIBE 10 MG/1
10 TABLET ORAL DAILY
Refills: 0 | Status: DISCONTINUED | OUTPATIENT
Start: 2025-07-09 | End: 2025-07-09

## 2025-07-09 RX ORDER — AMLODIPINE BESYLATE 10 MG/1
1 TABLET ORAL
Qty: 0 | Refills: 0 | DISCHARGE

## 2025-07-09 RX ORDER — EZETIMIBE 10 MG/1
1 TABLET ORAL
Qty: 0 | Refills: 0 | DISCHARGE

## 2025-07-09 RX ORDER — ACETAMINOPHEN 500 MG/5ML
650 LIQUID (ML) ORAL EVERY 6 HOURS
Refills: 0 | Status: DISCONTINUED | OUTPATIENT
Start: 2025-07-09 | End: 2025-07-09

## 2025-07-09 RX ORDER — ROSUVASTATIN CALCIUM 20 MG/1
10 TABLET, FILM COATED ORAL AT BEDTIME
Refills: 0 | Status: DISCONTINUED | OUTPATIENT
Start: 2025-07-09 | End: 2025-07-09

## 2025-07-09 RX ORDER — CIPROFLOXACIN HCL 250 MG
1 TABLET ORAL
Qty: 14 | Refills: 0
Start: 2025-07-09 | End: 2025-07-15

## 2025-07-09 RX ORDER — ROSUVASTATIN CALCIUM 20 MG/1
1 TABLET, FILM COATED ORAL
Qty: 0 | Refills: 0 | DISCHARGE

## 2025-07-09 RX ORDER — SENNA 187 MG
2 TABLET ORAL DAILY
Refills: 0 | Status: DISCONTINUED | OUTPATIENT
Start: 2025-07-09 | End: 2025-07-09

## 2025-07-09 RX ADMIN — Medication 81 MILLIGRAM(S): at 11:06

## 2025-07-09 RX ADMIN — Medication 2 TABLET(S): at 11:06

## 2025-07-09 RX ADMIN — Medication 25 GRAM(S): at 10:59

## 2025-07-09 RX ADMIN — Medication 25 GRAM(S): at 02:12

## 2025-07-09 RX ADMIN — POLYETHYLENE GLYCOL 3350 17 GRAM(S): 17 POWDER, FOR SOLUTION ORAL at 11:06

## 2025-07-09 RX ADMIN — Medication 650 MILLIGRAM(S): at 11:06

## 2025-07-09 RX ADMIN — EZETIMIBE 10 MILLIGRAM(S): 10 TABLET ORAL at 11:06

## 2025-07-09 RX ADMIN — SODIUM CHLORIDE 140 MILLILITER(S): 9 INJECTION, SOLUTION INTRAVENOUS at 05:14

## 2025-07-09 RX ADMIN — Medication 160 MILLIGRAM(S): at 05:16

## 2025-07-09 NOTE — CONSULT NOTE ADULT - SUBJECTIVE AND OBJECTIVE BOX
EMIRJEANEREYNALDOLISA MAYA  54y, Male  Allergy: penicillin (Unknown)      CHIEF COMPLAINT: Perforated diverticulitis (09 Jul 2025 07:42)      LOS  2d    HPI:  54 y.o. M w/ PMH of HTN, HLD, spinal stenosis, R inguinal hernia repair in 1995 (Dr. Lawler) who came in to the hospital due to sharp pain he felt after lifting 40 Lb box.  The pain is located in LLQ, doesn't radiate to the back. Pt mentioned having similar pain about 2 month ago but not that sharp and acute. Pt denies any N/V. On PE pt denies any pain, nontender, nondistended, non peritonitic.    (07 Jul 2025 11:39)      INFECTIOUS DISEASE HISTORY:  History as above.    PAST MEDICAL & SURGICAL HISTORY:      FAMILY HISTORY  Family history of early CAD (Father, Sibling)        SOCIAL HISTORY  Social History:        ROS  General: Denies rigors, nightsweats  HEENT: Denies headache, rhinorrhea, sore throat, eye pain  CV: Denies CP, palpitations  PULM: Denies wheezing, hemoptysis  GI: Denies hematemesis, hematochezia, melena  : Denies discharge, hematuria  MSK: Denies arthralgias, myalgias  SKIN: Denies rash, lesions  NEURO: Denies paresthesias, weakness  PSYCH: Denies depression, anxiety    VITALS:  T(F): 97.6, Max: 98.7 (07-09-25 @ 04:15)  HR: 65  BP: 123/78  RR: 18Vital Signs Last 24 Hrs  T(C): 36.4 (09 Jul 2025 11:20), Max: 37.1 (09 Jul 2025 04:15)  T(F): 97.6 (09 Jul 2025 11:20), Max: 98.7 (09 Jul 2025 04:15)  HR: 65 (09 Jul 2025 11:20) (56 - 70)  BP: 123/78 (09 Jul 2025 11:20) (108/71 - 124/84)  BP(mean): --  RR: 18 (09 Jul 2025 11:20) (18 - 18)  SpO2: 96% (09 Jul 2025 11:20) (93% - 98%)    Parameters below as of 08 Jul 2025 20:03  Patient On (Oxygen Delivery Method): room air        PHYSICAL EXAM:  Gen: NAD, resting in bed  HEENT: Normocephalic, atraumatic  Neck: supple, no lymphadenopathy  CV: Regular rate & regular rhythm  Lungs: decreased BS at bases, no fremitus  Abdomen: Soft, BS present  Ext: Warm, well perfused  Neuro: non focal, awake  Skin: no rash, no erythema  Lines: no phlebitis    TESTS & MEASUREMENTS:                        14.5   5.37  )-----------( 142      ( 08 Jul 2025 20:00 )             43.6     07-08    139  |  103  |  9[L]  ----------------------------<  69[L]  4.0   |  25  |  1.1    Ca    8.8      08 Jul 2025 20:00  Phos  3.3     07-08  Mg     2.2     07-08          Urinalysis Basic - ( 08 Jul 2025 20:00 )    Color: x / Appearance: x / SG: x / pH: x  Gluc: 69 mg/dL / Ketone: x  / Bili: x / Urobili: x   Blood: x / Protein: x / Nitrite: x   Leuk Esterase: x / RBC: x / WBC x   Sq Epi: x / Non Sq Epi: x / Bacteria: x        Culture - Blood (collected 07-07-25 @ 10:11)  Source: Blood Blood  Preliminary Report (07-08-25 @ 20:01):    No growth at 24 hours    Culture - Blood (collected 07-07-25 @ 10:11)  Source: Blood Blood  Preliminary Report (07-08-25 @ 20:01):    No growth at 24 hours        Lactate, Blood: 1.0 mmol/L (07-07-25 @ 08:45)      INFECTIOUS DISEASES TESTING      RADIOLOGY & ADDITIONAL TESTS:  I have personally reviewed the last Chest xray  CXR      CT  CT Abdomen and Pelvis w/ IV Cont:   ACC: [id number] EXAM:  CT ABDOMEN AND PELVIS IC   ORDERED BY: KARLEY COBIAN     PROCEDURE DATE:  07/07/2025          INTERPRETATION:  CT ABDOMEN AND PELVIS WITH IV CONTRAST    Clinical Indication: Abdominal pain after lifting something heavy.    Technique: Multidetector-row CT images of the abdomen and pelvis were   obtained from the xiphoid through the symphysis pubis following the   administration of intravenous contrast. No oral contrast was   administered.  Coronal and sagittal reconstructions were performed.    Contrast: 95 cc Omnipaque 350 non-ionic intravenous contrast.    Comparison: None available.    Findings:    01. LIVER: Normal morphology. Subcentimeter hypodensity may represent a   cyst.  02. SPLEEN: Normal.  03. PANCREAS: Normal.  04. GALLBLADDER/BILIARY TREE: No biliary duct dilatation.  Normal   gallbladder.  05. ADRENALS: Normal.  06. KIDNEYS: Symmetric enhancement.  No hydronephrosis or renal stone.  07. LYMPHADENOPATHY/RETROPERITONEUM: No enlarged lymph nodes.  08. BOWEL: Minimal stranding seen at the distal transverse colon near the   splenic flexure concerning for diverticulitis. Another focus of stranding   seen at the level of the proximal sigmoid colon (series 4 image 94) with   adjacent free air concerning for perforation. No bowel obstruction.  09. PELVIC VISCERA: Prostate measures 4 cm in transverse dimension   (series 4 image 132). Underdistended urinary bladder.  10. PELVIC LYMPH NODES: No enlarged lymph nodes.  11. VASCULATURE: No abdominal aortic aneurysm.  12. PERITONEUM/ABDOMINAL WALL: Free peritoneal air predominance seen in   the upper quadrants. No gross ascites. Fat-containing left inguinal   hernia.  13. SKELETAL: Degenerative changes.  14. LUNG BASES: Right lower lobe linear atelectasis.Additional areas of   small atelectasis. Coronary artery calcification    IMPRESSION:    Multifocal diverticulitis as described. Free peritoneal air is noted   which is highly concerning for perforation.    No bowel obstruction.    Dr. Oconnell [facility] at 9:30 AM on 7/7/2025 regarding   findings with read back.    --- End of Report ---            PARVIZ OCONNELL MD; Attending Radiologist  This document has been electronically signed. Jul 7 2025  9:30AM (07-07-25 @ 09:04)      CARDIOLOGY TESTING      MEDICATIONS  acetaminophen     Tablet .. 650 Oral every 6 hours  amLODIPine   Tablet 5 Oral at bedtime  aspirin enteric coated 81 Oral daily  chlorhexidine 2% Cloths 1 Topical <User Schedule>  enoxaparin Injectable 40 SubCutaneous every 24 hours  ezetimibe 10 Oral daily  pantoprazole    Tablet 40 Oral before breakfast  piperacillin/tazobactam IVPB.. 3.375 IV Intermittent every 8 hours  polyethylene glycol 3350 17 Oral daily  rosuvastatin 10 Oral at bedtime  senna 2 Oral daily  valsartan 160 Oral daily      Weight  Weight (kg): 100 (07-07-25 @ 07:52)    ANTIBIOTICS:  piperacillin/tazobactam IVPB.. 3.375 Gram(s) IV Intermittent every 8 hours      ALLERGIES:  penicillin (Unknown)

## 2025-07-09 NOTE — DISCHARGE NOTE NURSING/CASE MANAGEMENT/SOCIAL WORK - NSDCPETBCESMAN_GEN_ALL_CORE
If you are a smoker, it is important for your health to stop smoking. Please be aware that second hand smoke is also harmful. GOAL: Pt will increase static/ dynamic standing balance to Good- with RW to improve safety with functional activities in 4 weeks.

## 2025-07-09 NOTE — DISCHARGE NOTE PROVIDER - CARE PROVIDER_API CALL
Juliette Dunlap  Surgical Critical Care  [address] [place] [address], [address] [place] [address]  Phone: [phone]  Fax: [phone]  Follow Up Time: 2 weeks

## 2025-07-09 NOTE — PROGRESS NOTE ADULT - SUBJECTIVE AND OBJECTIVE BOX
GENERAL SURGERY PROGRESS NOTE    Patient: LISA VELAZQUEZ , 54y ([birth year])Male   MRN: [id number]  Location: [place] [address] A  Visit: 07-07-25 Inpatient  Date: 07-09-25 @ 02:33    Hospital Day #: 3    Procedure/Dx/Injuries: PERFORATED DIVERTICULITIS    Events of past 24 hours:  Patient seen and examined at bedside. Patient endorses that abdominal pain continues to lessen. Patient denies N/V.       PAST MEDICAL & SURGICAL HISTORY:      Vitals:   T(F): 97.7 (07-08-25 @ 20:03), Max: 98 (07-08-25 @ 04:47)  HR: 60 (07-08-25 @ 20:03)  BP: 124/84 (07-08-25 @ 20:03)  RR: 18 (07-08-25 @ 20:03)  SpO2: 98% (07-08-25 @ 20:03)      Diet, Clear Liquid      Fluids:     I & O's:    07-07-25 @ 07:01  -  07-08-25 @ 07:00  --------------------------------------------------------  IN:    Lactated Ringers: 1820 mL  Total IN: 1820 mL    OUT:  Total OUT: 0 mL    Total NET: 1820 mL      Physical Exam: PHYSICAL EXAM:  General: NAD, AAOx3, calm and cooperative  HEENT: N Trachea ML, Neck supple  Cardiac: RRR  Respiratory: chest rise equal bilaterally  Abdomen: Soft, non-distended, mildly tender to LLQ, non peritonitic   Musculoskeletal: Strength 5/5 BL UE/LE, ROM intact, compartments soft  Skin: Warm/dry, normal color, no jaundice      MEDICATIONS  (STANDING):  acetaminophen   IVPB .. 1000 milliGRAM(s) IV Intermittent every 6 hours  amLODIPine   Tablet 5 milliGRAM(s) Oral at bedtime  aspirin enteric coated 81 milliGRAM(s) Oral daily  chlorhexidine 2% Cloths 1 Application(s) Topical <User Schedule>  enoxaparin Injectable 40 milliGRAM(s) SubCutaneous every 24 hours  lactated ringers. 1000 milliLiter(s) (140 mL/Hr) IV Continuous <Continuous>  pantoprazole  Injectable 40 milliGRAM(s) IV Push daily  piperacillin/tazobactam IVPB.. 3.375 Gram(s) IV Intermittent every 8 hours  valsartan 160 milliGRAM(s) Oral daily    MEDICATIONS  (PRN):  ketorolac   Injectable 15 milliGRAM(s) IV Push every 6 hours PRN Moderate Pain (4 - 6)      DVT PROPHYLAXIS: enoxaparin Injectable 40 milliGRAM(s) SubCutaneous every 24 hours    GI PROPHYLAXIS: pantoprazole  Injectable 40 milliGRAM(s) IV Push daily    ANTICOAGULATION:   ANTIBIOTICS:  piperacillin/tazobactam IVPB.. 3.375 Gram(s)            LAB/STUDIES:  Labs:  CAPILLARY BLOOD GLUCOSE                              14.5   5.37  )-----------( 142      ( 08 Jul 2025 20:00 )             43.6       Auto Immature Granulocyte %: 0.2 % (07-08-25 @ 20:00)    07-08    139  |  103  |  9[L]  ----------------------------<  69[L]  4.0   |  25  |  1.1      Calcium: 8.8 mg/dL (07-08-25 @ 20:00)      LFTs:             7.1  | 0.5  | 21       ------------------[47      ( 07 Jul 2025 08:45 )  4.3  | 0.2  | 33          Lipase:26     Amylase:x         Lactate, Blood: 1.0 mmol/L (07-07-25 @ 08:45)      Coags:     10.70  ----< 0.91    ( 07 Jul 2025 10:11 )     30.8                Urinalysis Basic - ( 08 Jul 2025 20:00 )    Color: x / Appearance: x / SG: x / pH: x  Gluc: 69 mg/dL / Ketone: x  / Bili: x / Urobili: x   Blood: x / Protein: x / Nitrite: x   Leuk Esterase: x / RBC: x / WBC x   Sq Epi: x / Non Sq Epi: x / Bacteria: x        Culture - Blood (collected 07 Jul 2025 10:11)  Source: Blood Blood  Preliminary Report (08 Jul 2025 20:01):    No growth at 24 hours    Culture - Blood (collected 07 Jul 2025 10:11)  Source: Blood Blood  Preliminary Report (08 Jul 2025 20:01):    No growth at 24 hours             GENERAL SURGERY PROGRESS NOTE    Patient: LISA VELAZQUEZ , 54y ([birth year])Male   MRN: [id number]  Location: [place] [address] A  Visit: 07-07-25 Inpatient  Date: 07-09-25 @ 02:33    Hospital Day #: 3    Procedure/Dx/Injuries: PERFORATED DIVERTICULITIS    Events of past 24 hours:  Patient seen and examined at bedside. Patient endorses that abdominal pain continues to lessen. Patient denies N/V.       Vitals:   T(F): 97.7 (07-08-25 @ 20:03), Max: 98 (07-08-25 @ 04:47)  HR: 60 (07-08-25 @ 20:03)  BP: 124/84 (07-08-25 @ 20:03)  RR: 18 (07-08-25 @ 20:03)  SpO2: 98% (07-08-25 @ 20:03)      Diet, Clear Liquid      Fluids:     I & O's:    07-07-25 @ 07:01  -  07-08-25 @ 07:00  --------------------------------------------------------  IN:    Lactated Ringers: 1820 mL  Total IN: 1820 mL    OUT:  Total OUT: 0 mL    Total NET: 1820 mL    PHYSICAL EXAM:  GENERAL: NAD, AAOx3  HEENT: NCAT, trachea ML, neck supple  CARDIAC: RRR  RESPIRATORY: chest rise equal b/l, no labored breathing  ABDOMEN: soft, mildly tender in LLQ, nondistended  EXTREMITIES: ZUNIGA, soft, well perfused  SKIN: warm, dry, no color changes, no jaundice    MEDICATIONS  (STANDING):  acetaminophen   IVPB .. 1000 milliGRAM(s) IV Intermittent every 6 hours  amLODIPine   Tablet 5 milliGRAM(s) Oral at bedtime  aspirin enteric coated 81 milliGRAM(s) Oral daily  chlorhexidine 2% Cloths 1 Application(s) Topical <User Schedule>  enoxaparin Injectable 40 milliGRAM(s) SubCutaneous every 24 hours  lactated ringers. 1000 milliLiter(s) (140 mL/Hr) IV Continuous <Continuous>  pantoprazole  Injectable 40 milliGRAM(s) IV Push daily  piperacillin/tazobactam IVPB.. 3.375 Gram(s) IV Intermittent every 8 hours  valsartan 160 milliGRAM(s) Oral daily    MEDICATIONS  (PRN):  ketorolac   Injectable 15 milliGRAM(s) IV Push every 6 hours PRN Moderate Pain (4 - 6)    DVT PROPHYLAXIS: enoxaparin Injectable 40 milliGRAM(s) SubCutaneous every 24 hours  GI PROPHYLAXIS: pantoprazole  Injectable 40 milliGRAM(s) IV Push daily  ANTIBIOTICS:  piperacillin/tazobactam IVPB.. 3.375 Gram(s)    LAB/STUDIES:                      14.5   5.37  )-----------( 142      ( 08 Jul 2025 20:00 )             43.6       Auto Immature Granulocyte %: 0.2 % (07-08-25 @ 20:00)    07-08    139  |  103  |  9[L]  ----------------------------<  69[L]  4.0   |  25  |  1.1      Calcium: 8.8 mg/dL (07-08-25 @ 20:00)      LFTs:             7.1  | 0.5  | 21       ------------------[47      ( 07 Jul 2025 08:45 )  4.3  | 0.2  | 33          Lipase:26        Lactate, Blood: 1.0 mmol/L (07-07-25 @ 08:45)    Coags:     10.70  ----< 0.91    ( 07 Jul 2025 10:11 )     30.8     Urinalysis Basic - ( 08 Jul 2025 20:00 )    Color: x / Appearance: x / SG: x / pH: x  Gluc: 69 mg/dL / Ketone: x  / Bili: x / Urobili: x   Blood: x / Protein: x / Nitrite: x   Leuk Esterase: x / RBC: x / WBC x   Sq Epi: x / Non Sq Epi: x / Bacteria: x    Culture - Blood (collected 07 Jul 2025 10:11)  Source: Blood Blood  Preliminary Report (08 Jul 2025 20:01):    No growth at 24 hours    Culture - Blood (collected 07 Jul 2025 10:11)  Source: Blood Blood  Preliminary Report (08 Jul 2025 20:01):    No growth at 24 hours

## 2025-07-09 NOTE — DISCHARGE NOTE PROVIDER - HOSPITAL COURSE
54 y.o. M w/ PMH of HTN, HLD, spinal stenosis, R inguinal hernia repair in 1995 (Dr. Lawler) who came in to the hospital due to sharp pain he felt after lifting 40 Lb box.  The pain is located in LLQ, doesn't radiate to the back. Pt mentioned having similar pain about 2 month ago but not that sharp and acute. Pt denies any N/V. On PE pt denies any pain, nontender, nondistended, non peritonitic. CT Abdomen and Pelvix showed multifocal diverticulitis with minimal stranding at distal transverse colon near splenic flexure with free peritoneal air concerning for perforation. Patient kept NPO and started on antibiotics and observed with serial abdominal exams. Abdominal exams improved and patient passing gas. Patient restarted on regular diet.   54 y.o. M w/ PMH of HTN, HLD, spinal stenosis, R inguinal hernia repair in 1995 (Dr. Lawler) who came in to the hospital due to sharp pain he felt after lifting 40 Lb box.  The pain is located in LLQ, doesn't radiate to the back. Pt mentioned having similar pain about 2 month ago but not that sharp and acute. Pt denies any N/V. On PE pt denies any pain, nontender, nondistended, non peritonitic. CT Abdomen and Pelvis showed multifocal diverticulitis with minimal stranding at distal transverse colon near splenic flexure with free peritoneal air concerning for perforation. Patient kept NPO and started on antibiotics and observed with serial abdominal exams. Abdominal exams improved and patient passing gas. Patient restarted on regular diet, tolerating well without nausea or vomiting. Patient is ambulating and voiding appropriately. He is hemodynamically stable and ready to be discharged home.

## 2025-07-09 NOTE — CONSULT NOTE ADULT - ASSESSMENT
ASSESSMENT  54 y.o. M w/ PMH of HTN, HLD, spinal stenosis, R inguinal hernia repair in 1995 (Dr. Lawler) who came in to the hospital due to sharp pain he felt after lifting 40 Lb box.  The pain is located in LLQ, doesn't radiate to the back. Pt mentioned having similar pain about 2 month ago but not that sharp and acute. Pt denies any N/V. On PE pt denies any pain, nontender, nondistended, non peritonitic.    (07 Jul 2025 11:39)      IMPRESSION  #Diverticulitis with perforation   #s/p right inguinal hernia repair     #Obesity BMI (kg/m2): 33.5  #Abx allergy: penicillin (Unknown)    RECOMMENDATIONS  This is a preliminary incomplete pended note, all final recommendations to follow after interview and examination of the patient.    Please call or message on [employer] Teams if with any questions.  Spectra [phone]     ASSESSMENT  54 y.o. M w/ PMH of HTN, HLD, spinal stenosis, R inguinal hernia repair in 1995 (Dr. Lawler) who came in to the hospital due to sharp pain he felt after lifting 40 Lb box.  The pain is located in LLQ, doesn't radiate to the back. Pt mentioned having similar pain about 2 month ago but not that sharp and acute. Pt denies any N/V. On PE pt denies any pain, nontender, nondistended, non peritonitic.    (07 Jul 2025 11:39)      IMPRESSION  #Diverticulitis with perforation   - CT Abdomen and Pelvis w/ IV Cont (07.07.25 @ 09:04): IMPRESSION: Multifocal diverticulitis as described. Free peritoneal air is noted  which is highly concerning for perforation. No bowel obstruction.    #s/p right inguinal hernia repair     #Obesity BMI (kg/m2): 33.5  #Abx allergy: penicillin (Unknown)    RECOMMENDATIONS  - Diverticulitis with microperforation without abscess-- no clear risk factors for multidrug resistant organisms   - can switch to PO cipro 750 mg q 12 hours and PO flagyl 500 mg q 12 hours to complete until 7/16   - eventual outpatient colonoscopy     Please call or message on [employer] Teams if with any questions.  Spectra [phone]

## 2025-07-09 NOTE — DISCHARGE NOTE PROVIDER - NSDCCPCAREPLAN_GEN_ALL_CORE_FT
PRINCIPAL DISCHARGE DIAGNOSIS  Diagnosis: Acute diverticulitis  Assessment and Plan of Treatment: You were admitted for acute diverticulitis. You have inflammation of your intestines. You were treated with antibiotics.  Diet: Continue regular diet  Pain: You can take over the counter medications such as Tylenol, and Ibuprofen for pain control. Please adhere to the instructions on the back of the bottle.  Antibiotics: You should take  Follow up: Please follow up  with Dr. Dunlap in 1-2 weeks. Please call [phone] to make an appointment.  Please follow up with Gastroenterology outpatient.  If you develop fevers, chills, worsening pain, increased drainage from the wound, foul smelling drainage from the wound, nausea that won't subside, vomiting, or any other symptoms of concern, please call MD or return to the ED for further advice, evaluation, and/or treatment.     PRINCIPAL DISCHARGE DIAGNOSIS  Diagnosis: Acute diverticulitis  Assessment and Plan of Treatment: You were admitted for acute diverticulitis. You have inflammation of your intestines. You were treated with antibiotics.  Diet: Continue regular diet  Pain: You can take over the counter medications such as Tylenol, and Ibuprofen for pain control. Please adhere to the instructions on the back of the bottle.  Antibiotics: You should take ciprofloxacin 750 mg every 12 hours and flagyl 500 mg every 12 hours until 7/16/25. Please finish entire course of antibiotics.  Follow up: Please follow up with Dr. Dunlap in 1-2 weeks. Please call [phone] to make an appointment.  Please follow up with Gastroenterology outpatient for colonoscopy.  If you develop fevers, chills, worsening pain, nausea that won't subside, vomiting, or any other symptoms of concern, please call MD or return to the ED for further advice, evaluation, and/or treatment.

## 2025-07-09 NOTE — DISCHARGE NOTE NURSING/CASE MANAGEMENT/SOCIAL WORK - PATIENT PORTAL LINK FT
You can access the FollowMyHealth Patient Portal offered by [facility] by registering at the following website: [email or web address]. By joining [employer]’s FollowMyHealth portal, you will also be able to view your health information using other applications (apps) compatible with our system.

## 2025-07-09 NOTE — DISCHARGE NOTE PROVIDER - NSFOLLOWUPCLINICS_GEN_ALL_ED_FT
[facility]  Gastroenterology  [address] [place] [address]  Phone: [phone]  Fax: [phone]  Follow Up Time: 2 weeks

## 2025-07-09 NOTE — PROGRESS NOTE ADULT - ASSESSMENT
ASSESSMENT:  54 y.o. M w/ PMH of HTN, HLD, spinal stenosis, R inguinal hernia repair in 1995 (Dr. Lawler) who came in to the hospital due to sharp pain he felt after lifting 40 Lb box. Physical exam findings, imaging, and labs as documented above.     PLAN:    - NPO + IVF  - Serial abd exam  - Abx   - DVT and GI ppx  - Ambulation as tolerating 54 y M w/ PMH of HTN, HLD, spinal stenosis, R inguinal hernia repair in 1995 (Dr. Lawler) who came in to the hospital due to sharp pain he felt after lifting 40 Lb box. Physical exam findings, imaging, and labs as documented above. Consistent with perforated diverticulitis.     PLAN:  - Advance to low fiber diet, IVL  - Pain control  - Continue IV zosyn  - Resumed home medications  - ID consult for antibiotic regimen on discharge  - Monitor abdominal exam  - DVT and GI ppx  - Encourage ambulation, activity as tolerated  - Monitor labs, replete PRN  - Possible discharge home today 7/9    SPECTRA: [id number]

## 2025-07-09 NOTE — DISCHARGE NOTE PROVIDER - NSDCMRMEDTOKEN_GEN_ALL_CORE_FT
amLODIPine 5 mg oral tablet: 1 tab(s) orally  aspirin 81 mg oral capsule: 1 cap(s) orally once a day   dexlansoprazole 60 mg oral delayed release capsule: 1 cap(s) orally  ezetimibe 10 mg oral tablet: 1 tab(s) orally  rosuvastatin 10 mg oral tablet: 1 tab(s) orally  valsartan 160 mg oral tablet: 1 tab(s) orally   amLODIPine 5 mg oral tablet: 1 tab(s) orally once a day  aspirin 81 mg oral capsule: 1 cap(s) orally once a day   ciprofloxacin 750 mg oral tablet: 1 tab(s) orally every 12 hours  dexlansoprazole 60 mg oral delayed release capsule: 1 cap(s) orally  ezetimibe 10 mg oral tablet: 1 tab(s) orally once a day  metroNIDAZOLE 500 mg oral tablet: 1 tab(s) orally every 12 hours  rosuvastatin 10 mg oral tablet: 1 tab(s) orally once a day  valsartan 160 mg oral tablet: 1 tab(s) orally once a day

## 2025-07-09 NOTE — DISCHARGE NOTE NURSING/CASE MANAGEMENT/SOCIAL WORK - FINANCIAL ASSISTANCE
[facility] provides services at a reduced cost to those who are determined to be eligible through [facility]’s financial assistance program. Information regarding [facility]’s financial assistance program can be found by going to [email or web address] or by calling [phone].

## 2025-07-12 LAB
CULTURE RESULTS: SIGNIFICANT CHANGE UP
CULTURE RESULTS: SIGNIFICANT CHANGE UP
SPECIMEN SOURCE: SIGNIFICANT CHANGE UP
SPECIMEN SOURCE: SIGNIFICANT CHANGE UP

## 2025-07-15 DIAGNOSIS — E78.5 HYPERLIPIDEMIA, UNSPECIFIED: ICD-10-CM

## 2025-07-15 DIAGNOSIS — I10 ESSENTIAL (PRIMARY) HYPERTENSION: ICD-10-CM

## 2025-07-15 DIAGNOSIS — Z88.0 ALLERGY STATUS TO PENICILLIN: ICD-10-CM

## 2025-07-15 DIAGNOSIS — F17.210 NICOTINE DEPENDENCE, CIGARETTES, UNCOMPLICATED: ICD-10-CM

## 2025-07-15 DIAGNOSIS — M48.00 SPINAL STENOSIS, SITE UNSPECIFIED: ICD-10-CM

## 2025-07-15 DIAGNOSIS — K57.32 DIVERTICULITIS OF LARGE INTESTINE WITHOUT PERFORATION OR ABSCESS WITHOUT BLEEDING: ICD-10-CM
